# Patient Record
Sex: FEMALE | URBAN - METROPOLITAN AREA
[De-identification: names, ages, dates, MRNs, and addresses within clinical notes are randomized per-mention and may not be internally consistent; named-entity substitution may affect disease eponyms.]

---

## 2022-06-03 LAB
ANTIBODY SCREEN, EXTERNAL: NEGATIVE
CHLAMYDIA, EXTERNAL: NEGATIVE
HBSAG, EXTERNAL: NEGATIVE
HEPATITIS C AB,   EXT: NEGATIVE
HIV, EXTERNAL: NEGATIVE
N. GONORRHEA, EXTERNAL: NEGATIVE
RPR, EXTERNAL: NON REACTIVE
RUBELLA, EXTERNAL: NORMAL
TYPE, ABO & RH, EXTERNAL: NORMAL

## 2022-11-21 ENCOUNTER — HOSPITAL ENCOUNTER (EMERGENCY)
Age: 27
Discharge: HOME | End: 2022-11-21
Attending: OBSTETRICS & GYNECOLOGY

## 2022-11-21 VITALS
RESPIRATION RATE: 17 BRPM | DIASTOLIC BLOOD PRESSURE: 68 MMHG | SYSTOLIC BLOOD PRESSURE: 127 MMHG | HEART RATE: 88 BPM | TEMPERATURE: 98.3 F

## 2022-11-21 LAB
APPEARANCE UR: CLEAR
BACTERIA URNS QL MICRO: ABNORMAL /HPF
BILIRUB UR QL: NEGATIVE
COLOR UR: ABNORMAL
EPITH CASTS URNS QL MICRO: ABNORMAL /LPF
GLUCOSE UR STRIP.AUTO-MCNC: NEGATIVE MG/DL
HGB UR QL STRIP: NEGATIVE
HYALINE CASTS URNS QL MICRO: ABNORMAL /LPF (ref 0–5)
KETONES UR QL STRIP.AUTO: NEGATIVE MG/DL
LEUKOCYTE ESTERASE UR QL STRIP.AUTO: NEGATIVE
NITRITE UR QL STRIP.AUTO: NEGATIVE
PH UR STRIP: 7 [PH] (ref 5–8)
PROT UR STRIP-MCNC: NEGATIVE MG/DL
RBC #/AREA URNS HPF: ABNORMAL /HPF (ref 0–5)
SP GR UR REFRACTOMETRY: 1.01 (ref 1–1.03)
UA: UC IF INDICATED,UAUC: ABNORMAL
UROBILINOGEN UR QL STRIP.AUTO: 0.2 EU/DL (ref 0.2–1)
WBC URNS QL MICRO: ABNORMAL /HPF (ref 0–4)

## 2022-11-21 PROCEDURE — 99284 EMERGENCY DEPT VISIT MOD MDM: CPT

## 2022-11-21 PROCEDURE — 81001 URINALYSIS AUTO W/SCOPE: CPT

## 2022-11-21 RX ORDER — OMEPRAZOLE 10 MG/1
10 CAPSULE, DELAYED RELEASE ORAL DAILY
COMMUNITY

## 2022-11-21 NOTE — ED PROVIDER NOTES
Pt is a 29 yo G1 @ 34 2/7 wks who presents to [facility] with CC of decreased FM x 1.5 days and uterine cramps since yesterday. She denies any LOF, VB, changes in discharge. She reports that she normally easily gets her [facility] but now she's only getting about 5 movements per hour. She does endorse urinary frequency but no dysuria. She thinks she is hydrating well with 3-4 glasses/bottles of water per day, smaller bottles today. This pregnancy has been supervised per Dr. Jorge Alberto Garza of [facility] and is notable for IUGR (EFW 4#3oz 8%tile at 32 weeks) and failed 1 hour GTT but passed 3 hr GTT. Chief Complaint   Patient presents with    Contractions    Decreased Fetal Movement       Past Medical History:   Diagnosis Date    Asthma        History reviewed. No pertinent surgical history. History reviewed. No pertinent family history. Social History     Socioeconomic History    Marital status: Not on file     Spouse name: Not on file    Number of children: Not on file    Years of education: Not on file    Highest education level: Not on file   Occupational History    Not on file   Tobacco Use    Smoking status: Former     Types: Cigarettes     Quit date: 2012     Years since quitting: 10.8    Smokeless tobacco: Never   Substance and Sexual Activity    Alcohol use: Never    Drug use: Never    Sexual activity: Not on file   Other Topics Concern    Not on file   Social History Narrative    Not on file     Social Determinants of Health     Financial Resource Strain: Not on file   Food Insecurity: Not on file   Transportation Needs: Not on file   Physical Activity: Not on file   Stress: Not on file   Social Connections: Not on file   Intimate Partner Violence: Not on file   Housing Stability: Not on file         ALLERGIES: Milk    Review of Systems   Constitutional: Negative. HENT: Negative. Eyes: Negative. Respiratory: Negative. Cardiovascular: Negative. Gastrointestinal:  Positive for abdominal pain. Endocrine: Negative. Genitourinary:  Positive for frequency and pelvic pain. Musculoskeletal: Negative. Skin: Negative. Allergic/Immunologic: Negative. Neurological: Negative. Hematological: Negative. Psychiatric/Behavioral: Negative. Vitals:    11/21/22 1657   Resp: 17   Temp: 98.3 °F (36.8 °C)            Physical Exam  Vitals and nursing note reviewed. Exam conducted with a chaperone present. Constitutional:       Appearance: Normal appearance. HENT:      Head: Atraumatic. Eyes:      Extraocular Movements: Extraocular movements intact. Pulmonary:      Effort: Pulmonary effort is normal.   Abdominal:      Comments: Gravid, soft, non tender   Genitourinary:     General: Normal vulva. Comments: Cervix is closed but thin  Musculoskeletal:         General: Normal range of motion. Cervical back: Normal range of motion. Skin:     General: Skin is warm and dry. Neurological:      General: No focal deficit present. Mental Status: She is oriented to person, place, and time. Psychiatric:         Mood and Affect: Mood normal.         Behavior: Behavior normal.        MDM     Amount and/or Complexity of Data Reviewed  Clinical lab tests: ordered  Independent visualization of images, tracings, or specimens: (An NST was performed x 30 minutes and was reactive. The baseline FHR was 125. Moderate baseline  variability was noted. Accelerations of sufficient amplitude and duration were noted. There were no decelerations noted.   Uterine irritability noted.  )    Risk of Complications, Morbidity, and/or Mortality  Presenting problems: moderate  Diagnostic procedures: moderate  Management options: moderate    Patient Progress  Patient progress: stable            ED Course as of 11/21/22 1723   Mon Nov 21, 2022   1721 Admit to [facility]  Reactive NST  Uterine irritability on monitor:  cervix is soft but closed  Send UA & PO hydrate [DG]      ED Course User Index  [DG] [facility] DIONICIO       Procedures    @[facility]@

## 2022-11-21 NOTE — PROGRESS NOTES
1726 urine sample obtained and sent to lab. 1830 cervical exam is unchanged. Pt is cleared to be discharged home per Dr Nik Krause. All instructions given to pt and she states understanding.

## 2022-11-21 NOTE — PROGRESS NOTES
1650 pt arrives ambulatory accompanied by spouse w/ c/o mild cramping that began yesterday and has become pretty \"consistent over the last hour. \" Pt also reports feeling DFM over the past two days--about 5 movements an hour. Pt denies VB, LOF, h/a, blurry vision.  EFM applied and MD notified

## 2022-12-09 LAB — GRBS, EXTERNAL: NEGATIVE

## 2022-12-15 ENCOUNTER — HOSPITAL ENCOUNTER (INPATIENT)
Age: 27
LOS: 4 days | Discharge: HOME | DRG: 772 | End: 2022-12-19
Attending: OBSTETRICS & GYNECOLOGY | Admitting: OBSTETRICS & GYNECOLOGY
Payer: OTHER GOVERNMENT

## 2022-12-15 PROBLEM — O36.5990 IUGR (INTRAUTERINE GROWTH RESTRICTION) AFFECTING CARE OF MOTHER: Status: ACTIVE | Noted: 2022-12-15

## 2022-12-15 LAB
ERYTHROCYTE [DISTWIDTH] IN BLOOD BY AUTOMATED COUNT: 12.6 % (ref 11.5–14.5)
HCT VFR BLD AUTO: 36.3 % (ref 35–47)
HGB BLD-MCNC: 12.6 G/DL (ref 11.5–16)
MCH RBC QN AUTO: 33.2 PG (ref 26–34)
MCHC RBC AUTO-ENTMCNC: 34.7 G/DL (ref 30–36.5)
MCV RBC AUTO: 95.5 FL (ref 80–99)
NRBC # BLD: 0 K/UL (ref 0–0.01)
NRBC BLD-RTO: 0 PER 100 WBC
PLATELET # BLD AUTO: 162 K/UL (ref 150–400)
PMV BLD AUTO: 9.3 FL (ref 8.9–12.9)
RBC # BLD AUTO: 3.8 M/UL (ref 3.8–5.2)
WBC # BLD AUTO: 7.4 K/UL (ref 3.6–11)

## 2022-12-15 PROCEDURE — 75410000002 HC LABOR FEE PER 1 HR

## 2022-12-15 PROCEDURE — 3E0DXGC INTRODUCTION OF OTHER THERAPEUTIC SUBSTANCE INTO MOUTH AND PHARYNX, EXTERNAL APPROACH: ICD-10-PCS | Performed by: OBSTETRICS & GYNECOLOGY

## 2022-12-15 PROCEDURE — 86900 BLOOD TYPING SEROLOGIC ABO: CPT

## 2022-12-15 PROCEDURE — 65270000029 HC RM PRIVATE

## 2022-12-15 PROCEDURE — 74011250637 HC RX REV CODE- 250/637: Performed by: OBSTETRICS & GYNECOLOGY

## 2022-12-15 PROCEDURE — 85027 COMPLETE CBC AUTOMATED: CPT

## 2022-12-15 PROCEDURE — 36415 COLL VENOUS BLD VENIPUNCTURE: CPT

## 2022-12-15 RX ORDER — SODIUM CHLORIDE 0.9 % (FLUSH) 0.9 %
5-40 SYRINGE (ML) INJECTION EVERY 8 HOURS
Status: DISCONTINUED | OUTPATIENT
Start: 2022-12-15 | End: 2022-12-17 | Stop reason: HOSPADM

## 2022-12-15 RX ORDER — ONDANSETRON 2 MG/ML
8 INJECTION INTRAMUSCULAR; INTRAVENOUS
Status: DISCONTINUED | OUTPATIENT
Start: 2022-12-15 | End: 2022-12-17 | Stop reason: HOSPADM

## 2022-12-15 RX ORDER — ACETAMINOPHEN 500 MG
TABLET ORAL
COMMUNITY

## 2022-12-15 RX ORDER — OXYTOCIN/RINGER'S LACTATE 30/500 ML
10 PLASTIC BAG, INJECTION (ML) INTRAVENOUS AS NEEDED
Status: DISCONTINUED | OUTPATIENT
Start: 2022-12-15 | End: 2022-12-19 | Stop reason: HOSPADM

## 2022-12-15 RX ORDER — OXYTOCIN/RINGER'S LACTATE 30/500 ML
87.3 PLASTIC BAG, INJECTION (ML) INTRAVENOUS AS NEEDED
Status: DISCONTINUED | OUTPATIENT
Start: 2022-12-15 | End: 2022-12-19 | Stop reason: HOSPADM

## 2022-12-15 RX ORDER — NALBUPHINE HYDROCHLORIDE 10 MG/ML
10 INJECTION, SOLUTION INTRAMUSCULAR; INTRAVENOUS; SUBCUTANEOUS
Status: DISCONTINUED | OUTPATIENT
Start: 2022-12-15 | End: 2022-12-17 | Stop reason: HOSPADM

## 2022-12-15 RX ORDER — FENTANYL CITRATE 50 UG/ML
50 INJECTION, SOLUTION INTRAMUSCULAR; INTRAVENOUS
Status: DISCONTINUED | OUTPATIENT
Start: 2022-12-15 | End: 2022-12-17 | Stop reason: HOSPADM

## 2022-12-15 RX ORDER — SODIUM CHLORIDE, SODIUM LACTATE, POTASSIUM CHLORIDE, CALCIUM CHLORIDE 600; 310; 30; 20 MG/100ML; MG/100ML; MG/100ML; MG/100ML
125 INJECTION, SOLUTION INTRAVENOUS CONTINUOUS
Status: DISCONTINUED | OUTPATIENT
Start: 2022-12-15 | End: 2022-12-19 | Stop reason: HOSPADM

## 2022-12-15 RX ORDER — SODIUM CHLORIDE 0.9 % (FLUSH) 0.9 %
5-40 SYRINGE (ML) INJECTION AS NEEDED
Status: DISCONTINUED | OUTPATIENT
Start: 2022-12-15 | End: 2022-12-17 | Stop reason: HOSPADM

## 2022-12-15 RX ORDER — ZOLPIDEM TARTRATE 5 MG/1
5 TABLET ORAL ONCE
Status: ACTIVE | OUTPATIENT
Start: 2022-12-15 | End: 2022-12-16

## 2022-12-15 RX ORDER — TERBUTALINE SULFATE 1 MG/ML
0.25 INJECTION SUBCUTANEOUS AS NEEDED
Status: DISCONTINUED | OUTPATIENT
Start: 2022-12-15 | End: 2022-12-17 | Stop reason: HOSPADM

## 2022-12-15 RX ADMIN — Medication 50 MCG: at 16:58

## 2022-12-15 RX ADMIN — Medication 50 MCG: at 21:04

## 2022-12-15 NOTE — H&P
History & Physical    Name: Sadaf Avalos MRN: [id number]  SSN: [id number]    YOB: 1995  Age: 28 y.o. Sex: female      Subjective:     Estimated Date of Delivery: 22  OB History    Para Term  AB Living   1             SAB IAB Ectopic Molar Multiple Live Births                    # Outcome Date GA Lbr Chun/2nd Weight Sex Delivery Anes PTL Lv   1 Current                Ms. Mcadams Brochure is admitted with pregnancy at 37w5d for induction of labor due to IUGR. Prenatal course complicated by:  -IUGR diagnosed at 32 wk. EFW : 5#9oz(6%), AC 2%, has been following with [facility] with  testing and dopplers  -Elevated 1 hr, passed 3 hr GTT  -NIPT 46XY, AFP WNL  -Bpos/Rimm/GBS NEG  -She is s/p Tdap, flu, and covid vaccines including bivalent booster    -  Please see prenatal records for details. M recommended IOL between 40 and 38 wk for IUGR  Past Medical History:   Diagnosis Date    Asthma      No past surgical history on file. Social History     Occupational History    Not on file   Tobacco Use    Smoking status: Former     Types: Cigarettes     Quit date:      Years since quitting: 10.9    Smokeless tobacco: Never   Substance and Sexual Activity    Alcohol use: Never    Drug use: Never    Sexual activity: Not on file     No family history on file. Allergies   Allergen Reactions    Milk Unknown (comments)     Prior to Admission medications    Medication Sig Start Date End Date Taking? Authorizing Provider   PNV with Ca No.65-Iron Poly-FA 60 mg iron-1 mg cap Take  by mouth. Provider, Historical   omeprazole (PRILOSEC) 10 mg capsule Take 10 mg by mouth daily. Provider, Historical        Review of Systems: A comprehensive review of systems was negative except for that written in the History of Present Illness.     Objective:     Vitals:  Vitals:    12/15/22 1549   Weight: 90.7 kg (200 lb)   Height: 5' 7\" (1.702 m)        Physical Exam:  Patient without distress. Heart: Regular rate and rhythm  Lung: normal respiratory effort  Abdomen: soft, nontender  Cervical Exam: Closed/Thick/High  Lower Extremities:  - Edema No  Membranes:  Intact  Fetal Heart Rate: 130 moderate variability, present accels, no decels     Prenatal Labs:   No results found for: ABORH, RUBELLAEXT, HBSAGEXT, HIVEXT, RPREXT, GONNOEXT, CHLAMEXT, ABORHEXT, RUBELLAEXT, GRBSEXT, HBSAGEXT, HIVEXT, RPREXT, GONNOEXT, CHLAMEXT    Impression/Plan:     Active Problems:    IUGR (intrauterine growth restriction) affecting care of mother (12/15/2022)         Plan: Admit for induction of labor. Group B Strep negative  IOL with cytotec 50mcg q4hr prn fo cervical ripening. Reassess for cook balloon placement when able.   FHR cat 1    Betzaida Gonzalez MD   12/15/2022  4:34 PM

## 2022-12-15 NOTE — PROGRESS NOTES
Attempted insertion of cook balloon; ext os ftp/30/high and posterior. Unable to insert with speculum either. Will start misoprostol. Fht 120s mod +accels no decels.

## 2022-12-15 NOTE — ROUTINE PROCESS
12/15/2022  3:47 PM Patient arrived ambulatory to [facility] for scheduled IOL due to IUGR. Patient endorses positive fetal movement, denies leaking of fluid or vaginal bleeding. 1654: Call to Dr Steve Mccracken, plan to start cytotec now and evaluate for cervical ripening balloon. 1658: First dose of cytotec given per [facility]  1725: Dr Leana Lundy at bedside SVE fingertip/high and posterior. Unable to insert balloon at this time. Plan to continue cytotec and evaluate for insertion later. 1930: Bedside shift change report given to Chema Nguyen RN (oncoming nurse) by Juana Trevino RN (offgoing nurse). Report included the following information SBAR.

## 2022-12-16 PROCEDURE — 3E033VJ INTRODUCTION OF OTHER HORMONE INTO PERIPHERAL VEIN, PERCUTANEOUS APPROACH: ICD-10-PCS | Performed by: OBSTETRICS & GYNECOLOGY

## 2022-12-16 PROCEDURE — 74011250637 HC RX REV CODE- 250/637: Performed by: OBSTETRICS & GYNECOLOGY

## 2022-12-16 PROCEDURE — 74011250636 HC RX REV CODE- 250/636: Performed by: OBSTETRICS & GYNECOLOGY

## 2022-12-16 PROCEDURE — 75410000002 HC LABOR FEE PER 1 HR

## 2022-12-16 PROCEDURE — 65270000029 HC RM PRIVATE

## 2022-12-16 RX ORDER — OXYTOCIN/RINGER'S LACTATE 30/500 ML
0-20 PLASTIC BAG, INJECTION (ML) INTRAVENOUS
Status: DISCONTINUED | OUTPATIENT
Start: 2022-12-16 | End: 2022-12-17

## 2022-12-16 RX ADMIN — Medication 50 MCG: at 01:04

## 2022-12-16 RX ADMIN — Medication 50 MCG: at 09:01

## 2022-12-16 RX ADMIN — OXYTOCIN 1 MILLI-UNITS/MIN: 10 INJECTION INTRAVENOUS at 13:13

## 2022-12-16 RX ADMIN — ONDANSETRON HYDROCHLORIDE 8 MG: 2 SOLUTION INTRAMUSCULAR; INTRAVENOUS at 09:47

## 2022-12-16 RX ADMIN — SODIUM CHLORIDE, POTASSIUM CHLORIDE, SODIUM LACTATE AND CALCIUM CHLORIDE 500 ML: 600; 310; 30; 20 INJECTION, SOLUTION INTRAVENOUS at 09:45

## 2022-12-16 RX ADMIN — OXYTOCIN 8 MILLI-UNITS/MIN: 10 INJECTION INTRAVENOUS at 17:20

## 2022-12-16 RX ADMIN — SODIUM CHLORIDE, POTASSIUM CHLORIDE, SODIUM LACTATE AND CALCIUM CHLORIDE 125 ML/HR: 600; 310; 30; 20 INJECTION, SOLUTION INTRAVENOUS at 15:09

## 2022-12-16 RX ADMIN — SODIUM CHLORIDE, POTASSIUM CHLORIDE, SODIUM LACTATE AND CALCIUM CHLORIDE 125 ML/HR: 600; 310; 30; 20 INJECTION, SOLUTION INTRAVENOUS at 13:13

## 2022-12-16 RX ADMIN — SODIUM CHLORIDE, POTASSIUM CHLORIDE, SODIUM LACTATE AND CALCIUM CHLORIDE 125 ML/HR: 600; 310; 30; 20 INJECTION, SOLUTION INTRAVENOUS at 22:48

## 2022-12-16 RX ADMIN — Medication 50 MCG: at 05:10

## 2022-12-16 NOTE — PROGRESS NOTES
Labor Progress Note  Patient seen, fetal heart rate and contraction pattern evaluated, patient examined.     Visit Vitals  BP (!) 110/58   Pulse 80   Temp 98.2 °F (36.8 °C)   Resp 16   Ht 5' 7\" (1.702 m)   Wt 90.7 kg (200 lb)   SpO2 99%   BMI 31.32 kg/m²         Physical Exam:  Cervical Exam: 3-4/50/ballotable    FHR: 140 moderate variability, present accels, no decels    Assessment/Plan:  G1 @ 37w6d with IOL with IUGR  Cook catheter removed  Currently on pit, will continue pit and then AROM when able  GBS neg  FHR cat 1  Kesha Fair MD   12/16/2022  4:37 PM

## 2022-12-16 NOTE — PROGRESS NOTES
Labor Progress Note  G1 @ 37w6d with IOL with IUGR  S/P Jos  Currently on Pitocin  Pt sitting up with her mom and spouse at the bedside   States feeling mild contractions  Patient seen, fetal heart rate and contraction pattern evaluated. VSS AF  Physical Exam:  Cervical Exam: not examined  Membranes:  Intact  Uterine Activity: Frequency: Irregular  Fetal Heart Rate: Reactive  Accelerations: yes  Decelerations: none  Uterine contractions: irregular    Assessment/Plan:  Reassuring fetal status.    Will continue to titrate Dallin Kennedy MD

## 2022-12-16 NOTE — PROGRESS NOTES
Labor Progress Note  Patient seen, fetal heart rate and contraction pattern evaluated, patient examined.   Was able to sleep a bit last night  Visit Vitals  /75 (BP 1 Location: Left arm, BP Patient Position: At rest)   Pulse 70   Temp 98 °F (36.7 °C)   Resp 16   Ht 5' 7\" (1.702 m)   Wt 90.7 kg (200 lb)   SpO2 99%   BMI 31.32 kg/m²         Physical Exam:  Cervical Exam:  1/30/-3    FHR: 120 moderate variability, present accels, no decels    Assessment/Plan:  G1 @ 37w6d with IOL with IUGR  Cook catheter placed 60/60 at this check  Will do one more dose of cytotec (5th dose) and then transition to pitocin  GBS neg  FHR cat 1  Augustine Arana MD   12/16/2022  7:58 AM

## 2022-12-16 NOTE — PROGRESS NOTES
1940 Bedside and Verbal shift change report given to WYATT Arnett, RN by LD Patten, SHANKAR. Report included the following information SBAR, Intake/Output, and MAR.      2055 Patient off monitor to bathroom. 2103 Patient placed on monitor. 0530 Bedside and Verbal shift change report given to BRINDA Vanegas, SHANKAR by Melissa Castellanos. Patricia Arnett RN. Report included the following information SBAR, Intake/Output, and MAR.

## 2022-12-16 NOTE — PROGRESS NOTES
1545 Bedside and Verbal shift change report received from PITER Marin, SHANKAR. Report included the following information SBAR, Intake/Output, MAR, and Recent Results. 36 Dr. Roberta Suárez at bedside, cervical ripening balloon deflated 60/60 and removed intact. SVE 3-4/50/-3  1910 FHR tracing reviewed by Dr. Cathy Culver, Oxytocin decreased to 6 mu/min  1930 Bedside and Verbal shift change report given to LD Patten RN by Jairo Zendejas. Report included the following information SBAR, Intake/Output, MAR, and Recent Results.

## 2022-12-16 NOTE — PROGRESS NOTES
Labor Progress Note  Patient seen, fetal heart rate and contraction pattern evaluated, patient examined.   Feeling more contractions  Visit Vitals  BP (!) 108/58   Pulse 96   Temp 98 °F (36.7 °C)   Resp 16   Ht 5' 7\" (1.702 m)   Wt 90.7 kg (200 lb)   SpO2 99%   BMI 31.32 kg/m²         Physical Exam:  Cervical Exam: cook in place, not rechecked    FHR: 130 moderate variability, present accels, no decels    Assessment/Plan:  G1 @ 37w6d with IOL with IUGR  Cook catheter placed 60/60 at 0745  S/p 5 doses of cytotec, plan to transition to pit in next hour  GBS neg  FHR cat 1  Jay Vargas MD   12/16/2022  12:35 PM

## 2022-12-16 NOTE — PROGRESS NOTES
3535 SBAR report received from [employer] RN    3865 Cervical ripening balloon placed by Dr Amy Tinajero    8709 Nausea continues- IV bolus begun and Zofran given    9589 Bedside SBAR report to Ravin Garcia RN

## 2022-12-17 ENCOUNTER — ANESTHESIA (OUTPATIENT)
Dept: LABOR AND DELIVERY | Age: 27
DRG: 772 | End: 2022-12-17
Payer: OTHER GOVERNMENT

## 2022-12-17 ENCOUNTER — ANESTHESIA EVENT (OUTPATIENT)
Dept: LABOR AND DELIVERY | Age: 27
DRG: 772 | End: 2022-12-17
Payer: OTHER GOVERNMENT

## 2022-12-17 PROCEDURE — 76010000392 HC C SECN EA ADDL 0.5 HR: Performed by: OBSTETRICS & GYNECOLOGY

## 2022-12-17 PROCEDURE — 74011250636 HC RX REV CODE- 250/636: Performed by: ANESTHESIOLOGY

## 2022-12-17 PROCEDURE — 74011000250 HC RX REV CODE- 250: Performed by: ANESTHESIOLOGY

## 2022-12-17 PROCEDURE — 74011250637 HC RX REV CODE- 250/637: Performed by: OBSTETRICS & GYNECOLOGY

## 2022-12-17 PROCEDURE — 76010000391 HC C SECN FIRST 1 HR: Performed by: OBSTETRICS & GYNECOLOGY

## 2022-12-17 PROCEDURE — 74011250636 HC RX REV CODE- 250/636: Performed by: OBSTETRICS & GYNECOLOGY

## 2022-12-17 PROCEDURE — 74011000250 HC RX REV CODE- 250: Performed by: OBSTETRICS & GYNECOLOGY

## 2022-12-17 PROCEDURE — 65270000029 HC RM PRIVATE

## 2022-12-17 PROCEDURE — 74011000250 HC RX REV CODE- 250: Performed by: NURSE ANESTHETIST, CERTIFIED REGISTERED

## 2022-12-17 PROCEDURE — 75410000002 HC LABOR FEE PER 1 HR

## 2022-12-17 PROCEDURE — 74011250636 HC RX REV CODE- 250/636: Performed by: ADVANCED PRACTICE MIDWIFE

## 2022-12-17 PROCEDURE — 75410000003 HC RECOV DEL/VAG/CSECN EA 0.5 HR: Performed by: OBSTETRICS & GYNECOLOGY

## 2022-12-17 PROCEDURE — 74011000258 HC RX REV CODE- 258: Performed by: ADVANCED PRACTICE MIDWIFE

## 2022-12-17 PROCEDURE — 10907ZC DRAINAGE OF AMNIOTIC FLUID, THERAPEUTIC FROM PRODUCTS OF CONCEPTION, VIA NATURAL OR ARTIFICIAL OPENING: ICD-10-PCS | Performed by: OBSTETRICS & GYNECOLOGY

## 2022-12-17 PROCEDURE — 74011250636 HC RX REV CODE- 250/636: Performed by: NURSE ANESTHETIST, CERTIFIED REGISTERED

## 2022-12-17 PROCEDURE — 76060000078 HC EPIDURAL ANESTHESIA: Performed by: OBSTETRICS & GYNECOLOGY

## 2022-12-17 PROCEDURE — 74011250637 HC RX REV CODE- 250/637: Performed by: ADVANCED PRACTICE MIDWIFE

## 2022-12-17 RX ORDER — NALOXONE HYDROCHLORIDE 0.4 MG/ML
0.4 INJECTION, SOLUTION INTRAMUSCULAR; INTRAVENOUS; SUBCUTANEOUS AS NEEDED
Status: DISCONTINUED | OUTPATIENT
Start: 2022-12-17 | End: 2022-12-17 | Stop reason: HOSPADM

## 2022-12-17 RX ORDER — CALCIUM CARBONATE 200(500)MG
1000 TABLET,CHEWABLE ORAL ONCE
Status: COMPLETED | OUTPATIENT
Start: 2022-12-17 | End: 2022-12-17

## 2022-12-17 RX ORDER — OXYTOCIN/RINGER'S LACTATE 30/500 ML
PLASTIC BAG, INJECTION (ML) INTRAVENOUS
Status: COMPLETED
Start: 2022-12-17 | End: 2022-12-17

## 2022-12-17 RX ORDER — FENTANYL/BUPIVACAINE/NS/PF 2-1250MCG
1-16 PREFILLED PUMP RESERVOIR EPIDURAL CONTINUOUS
Status: DISCONTINUED | OUTPATIENT
Start: 2022-12-17 | End: 2022-12-17 | Stop reason: HOSPADM

## 2022-12-17 RX ORDER — PHENYLEPHRINE HCL IN 0.9% NACL 0.4MG/10ML
SYRINGE (ML) INTRAVENOUS AS NEEDED
Status: DISCONTINUED | OUTPATIENT
Start: 2022-12-17 | End: 2022-12-17 | Stop reason: HOSPADM

## 2022-12-17 RX ORDER — FENTANYL CITRATE 50 UG/ML
100 INJECTION, SOLUTION INTRAMUSCULAR; INTRAVENOUS ONCE
Status: DISCONTINUED | OUTPATIENT
Start: 2022-12-17 | End: 2022-12-17 | Stop reason: HOSPADM

## 2022-12-17 RX ORDER — LIDOCAINE HYDROCHLORIDE AND EPINEPHRINE 15; 5 MG/ML; UG/ML
INJECTION, SOLUTION EPIDURAL AS NEEDED
Status: DISCONTINUED | OUTPATIENT
Start: 2022-12-17 | End: 2022-12-17 | Stop reason: HOSPADM

## 2022-12-17 RX ORDER — ESMOLOL HYDROCHLORIDE 10 MG/ML
INJECTION INTRAVENOUS AS NEEDED
Status: DISCONTINUED | OUTPATIENT
Start: 2022-12-17 | End: 2022-12-17 | Stop reason: HOSPADM

## 2022-12-17 RX ORDER — ACETAMINOPHEN 500 MG
1000 TABLET ORAL
Status: COMPLETED | OUTPATIENT
Start: 2022-12-17 | End: 2022-12-17

## 2022-12-17 RX ORDER — OXYTOCIN/RINGER'S LACTATE 30/500 ML
0-20 PLASTIC BAG, INJECTION (ML) INTRAVENOUS
Status: DISCONTINUED | OUTPATIENT
Start: 2022-12-17 | End: 2022-12-17

## 2022-12-17 RX ORDER — BUPIVACAINE HYDROCHLORIDE 5 MG/ML
30 INJECTION, SOLUTION EPIDURAL; INTRACAUDAL AS NEEDED
Status: DISCONTINUED | OUTPATIENT
Start: 2022-12-17 | End: 2022-12-17 | Stop reason: HOSPADM

## 2022-12-17 RX ORDER — NORETHINDRONE AND ETHINYL ESTRADIOL 0.5-0.035
10 KIT ORAL ONCE
Status: COMPLETED | OUTPATIENT
Start: 2022-12-17 | End: 2022-12-17

## 2022-12-17 RX ORDER — FENTANYL CITRATE 50 UG/ML
INJECTION, SOLUTION INTRAMUSCULAR; INTRAVENOUS AS NEEDED
Status: DISCONTINUED | OUTPATIENT
Start: 2022-12-17 | End: 2022-12-17 | Stop reason: HOSPADM

## 2022-12-17 RX ORDER — BUPIVACAINE HYDROCHLORIDE 5 MG/ML
INJECTION, SOLUTION EPIDURAL; INTRACAUDAL AS NEEDED
Status: DISCONTINUED | OUTPATIENT
Start: 2022-12-17 | End: 2022-12-17 | Stop reason: HOSPADM

## 2022-12-17 RX ORDER — CARBOPROST TROMETHAMINE 250 UG/ML
INJECTION, SOLUTION INTRAMUSCULAR
Status: DISCONTINUED
Start: 2022-12-17 | End: 2022-12-17 | Stop reason: WASHOUT

## 2022-12-17 RX ORDER — METHYLERGONOVINE MALEATE 0.2 MG/ML
INJECTION INTRAVENOUS
Status: DISCONTINUED
Start: 2022-12-17 | End: 2022-12-17 | Stop reason: WASHOUT

## 2022-12-17 RX ORDER — TRANEXAMIC ACID 100 MG/ML
INJECTION, SOLUTION INTRAVENOUS
Status: DISCONTINUED
Start: 2022-12-17 | End: 2022-12-17 | Stop reason: WASHOUT

## 2022-12-17 RX ORDER — OXYTOCIN/RINGER'S LACTATE 30/500 ML
0-20 PLASTIC BAG, INJECTION (ML) INTRAVENOUS
Status: DISCONTINUED | OUTPATIENT
Start: 2022-12-17 | End: 2022-12-17 | Stop reason: HOSPADM

## 2022-12-17 RX ORDER — OXYTOCIN 10 [USP'U]/ML
INJECTION, SOLUTION INTRAMUSCULAR; INTRAVENOUS
Status: DISCONTINUED
Start: 2022-12-17 | End: 2022-12-17 | Stop reason: WASHOUT

## 2022-12-17 RX ORDER — LIDOCAINE HYDROCHLORIDE AND EPINEPHRINE 20; 5 MG/ML; UG/ML
INJECTION, SOLUTION EPIDURAL; INFILTRATION; INTRACAUDAL; PERINEURAL AS NEEDED
Status: DISCONTINUED | OUTPATIENT
Start: 2022-12-17 | End: 2022-12-17 | Stop reason: HOSPADM

## 2022-12-17 RX ORDER — MORPHINE SULFATE 0.5 MG/ML
INJECTION, SOLUTION EPIDURAL; INTRATHECAL; INTRAVENOUS AS NEEDED
Status: DISCONTINUED | OUTPATIENT
Start: 2022-12-17 | End: 2022-12-17 | Stop reason: HOSPADM

## 2022-12-17 RX ORDER — MISOPROSTOL 200 UG/1
TABLET ORAL
Status: DISCONTINUED
Start: 2022-12-17 | End: 2022-12-17 | Stop reason: WASHOUT

## 2022-12-17 RX ADMIN — SODIUM CHLORIDE, POTASSIUM CHLORIDE, SODIUM LACTATE AND CALCIUM CHLORIDE 125 ML/HR: 600; 310; 30; 20 INJECTION, SOLUTION INTRAVENOUS at 11:35

## 2022-12-17 RX ADMIN — LIDOCAINE HYDROCHLORIDE,EPINEPHRINE BITARTRATE 5 MG: 20; .005 INJECTION, SOLUTION EPIDURAL; INFILTRATION; INTRACAUDAL; PERINEURAL at 20:44

## 2022-12-17 RX ADMIN — CALCIUM CARBONATE (ANTACID) CHEW TAB 500 MG 1000 MG: 500 CHEW TAB at 05:28

## 2022-12-17 RX ADMIN — SODIUM CHLORIDE, POTASSIUM CHLORIDE, SODIUM LACTATE AND CALCIUM CHLORIDE 125 ML/HR: 600; 310; 30; 20 INJECTION, SOLUTION INTRAVENOUS at 20:29

## 2022-12-17 RX ADMIN — EPHEDRINE SULFATE 10 MG: 50 INJECTION INTRAVENOUS at 18:00

## 2022-12-17 RX ADMIN — AZITHROMYCIN MONOHYDRATE 500 MG: 500 INJECTION, POWDER, LYOPHILIZED, FOR SOLUTION INTRAVENOUS at 20:59

## 2022-12-17 RX ADMIN — AMPICILLIN SODIUM 2 G: 2 INJECTION, POWDER, FOR SOLUTION INTRAMUSCULAR; INTRAVENOUS at 15:56

## 2022-12-17 RX ADMIN — LIDOCAINE HYDROCHLORIDE,EPINEPHRINE BITARTRATE 5 MG: 20; .005 INJECTION, SOLUTION EPIDURAL; INFILTRATION; INTRACAUDAL; PERINEURAL at 20:41

## 2022-12-17 RX ADMIN — FENTANYL CITRATE 100 MCG: 50 INJECTION, SOLUTION INTRAMUSCULAR; INTRAVENOUS at 11:45

## 2022-12-17 RX ADMIN — LIDOCAINE HYDROCHLORIDE,EPINEPHRINE BITARTRATE 5 ML: 15; .005 INJECTION, SOLUTION EPIDURAL; INFILTRATION; INTRACAUDAL; PERINEURAL at 11:45

## 2022-12-17 RX ADMIN — BUPIVACAINE HYDROCHLORIDE 2 ML: 5 INJECTION, SOLUTION EPIDURAL; INTRACAUDAL; PERINEURAL at 11:45

## 2022-12-17 RX ADMIN — FENTANYL CITRATE 100 MCG: 50 INJECTION, SOLUTION INTRAMUSCULAR; INTRAVENOUS at 20:45

## 2022-12-17 RX ADMIN — Medication 10 ML/HR: at 11:50

## 2022-12-17 RX ADMIN — Medication 3 MG: at 21:43

## 2022-12-17 RX ADMIN — KETOROLAC TROMETHAMINE 30 MG: 30 INJECTION, SOLUTION INTRAMUSCULAR; INTRAVENOUS at 22:00

## 2022-12-17 RX ADMIN — SODIUM CHLORIDE, POTASSIUM CHLORIDE, SODIUM LACTATE AND CALCIUM CHLORIDE 1000 ML: 600; 310; 30; 20 INJECTION, SOLUTION INTRAVENOUS at 20:29

## 2022-12-17 RX ADMIN — Medication 10 ML/HR: at 19:47

## 2022-12-17 RX ADMIN — Medication 80 MCG: at 20:57

## 2022-12-17 RX ADMIN — ESMOLOL HYDROCHLORIDE 20 MG: 10 INJECTION, SOLUTION INTRAVENOUS at 20:57

## 2022-12-17 RX ADMIN — SODIUM CHLORIDE, POTASSIUM CHLORIDE, SODIUM LACTATE AND CALCIUM CHLORIDE 125 ML/HR: 600; 310; 30; 20 INJECTION, SOLUTION INTRAVENOUS at 15:51

## 2022-12-17 RX ADMIN — Medication 1 MILLI-UNITS/MIN: at 16:00

## 2022-12-17 RX ADMIN — CEFAZOLIN 2 G: 1 INJECTION, POWDER, FOR SOLUTION INTRAMUSCULAR; INTRAVENOUS at 20:44

## 2022-12-17 RX ADMIN — OXYTOCIN 1 MILLI-UNITS/MIN: 10 INJECTION INTRAVENOUS at 08:15

## 2022-12-17 RX ADMIN — ACETAMINOPHEN 1000 MG: 500 TABLET ORAL at 15:49

## 2022-12-17 RX ADMIN — SODIUM CHLORIDE, PRESERVATIVE FREE 5 ML: 5 INJECTION INTRAVENOUS at 18:00

## 2022-12-17 RX ADMIN — LIDOCAINE HYDROCHLORIDE,EPINEPHRINE BITARTRATE 5 MG: 20; .005 INJECTION, SOLUTION EPIDURAL; INFILTRATION; INTRACAUDAL; PERINEURAL at 20:38

## 2022-12-17 NOTE — PROGRESS NOTES
Labor Progress Note  Assumed care of pt under [facility] service   Patient seen, fetal heart rate and contraction pattern evaluated.  Reviewed POC with pt and family to increase pit to effect   Visit Vitals  /67   Pulse 92   Temp 98.4 °F (36.9 °C)   Resp 18   Ht 5' 7\" (1.702 m)   Wt 200 lb (90.7 kg)   SpO2 99%   BMI 31.32 kg/m²       Physical Exam:  Cervical Exam:  deferred   Membranes:   AROM by Bharath   Uterine Activity: mild infrequent  Fetal Heart Rate: Reactive  Pit 2 mu/mins    Assessment/Plan:  Reassuring fetal status, Continue plan for vaginal delivery    Antonio Fallon CNM

## 2022-12-17 NOTE — PROGRESS NOTES
Labor Progress Note  Patient seen, fetal heart rate and contraction pattern evaluated, patient examined. Visit Vitals  BP (!) 116/54 (BP 1 Location: Right arm, BP Patient Position: Lying left side)   Pulse 80   Temp 99.3 °F (37.4 °C)   Resp 16   Ht 5' 7\" (1.702 m)   Wt 200 lb (90.7 kg)   SpO2 97%   BMI 31.32 kg/m²       Physical Exam:  Cervical Exam:  4/edematous/-2 molding   Membranes:   Clear fluid  Uterine Activity: 3-4 not adequate  Fetal Heart Rate: Cat 2    Assessment/Plan:  Reassuring fetal status    Plan to increase pit to effect - when adequate for 2 hours recheck if no change proceed with a section.      Jeison Taylor CNM

## 2022-12-17 NOTE — PROGRESS NOTES
Labor Progress Note  Patient seen, fetal heart rate and contraction pattern evaluated. States feeling no contractions  Had gotten a pit break. Took shower and is ready to start on the second round of pitocin    VSS AF    Physical Exam:  Cervical Exam:4/70/-3: AROM : clear fluid  Membranes:  AROM  Uterine Activity: Frequency: Irregular  Fetal Heart Rate: Reactive  Accelerations: yes  Decelerations: none  Uterine contractions: irregular    Assessment/Plan:  Reassuring fetal status.    Continue to titrate Shaan Cruz MD

## 2022-12-17 NOTE — ANESTHESIA PROCEDURE NOTES
Epidural Block    Patient location during procedure: OB  Start time: 12/17/2022 11:39 AM  End time: 12/17/2022 11:49 AM  Reason for block: labor epidural  Staffing  Performed: attending   Anesthesiologist: Alcides Mcleod MD  Preanesthetic Checklist  Completed: patient identified, IV checked, site marked, risks and benefits discussed, surgical consent, monitors and equipment checked, pre-op evaluation and timeout performed  Block Placement  Patient position: left lateral decubitus  Prep: DuraPrep  Sterility prep: cap, drape, gloves and mask  Sedation level: no sedation  Patient monitoring: continuous pulse oximetry and heart rate  Approach: midline  Location: lumbar  Lumbar location: L3-L4  Epidural  Loss of resistance technique: air  Guidance: landmark technique  Needle  Needle type: Tuohy   Needle gauge: 17 G  Needle length: 9 cm  Needle insertion depth: 7 cm  Catheter type: end hole  Catheter size: 19 G  Catheter at skin depth: 12 cm  Catheter securement method: clear occlusive dressing, liquid medical adhesive and surgical tape  Assessment  Sensory level: T6  Block outcome: pain improved  Number of attempts: 1  Procedure assessment: patient tolerated procedure well with no immediate complications

## 2022-12-17 NOTE — PROGRESS NOTES
OBHospitalist Note:  Consulted By CNM Managing Patient for Variable Decelerations  Chart Reviewed:  Day 3 IOL for IUGR per MFM, S/P Cytotec, C/C and Pitocin, and AROM this Am by Dr Brendon Kaiser @ 4 cm (quiroga bulb).   Now With variable decelerations of=n EFM, with moderate variability  Recommend- IUPC with Amniofusion for Varible Decelerations  Continued Close Observation- continued CNM Management @ this Time

## 2022-12-17 NOTE — ANESTHESIA PREPROCEDURE EVALUATION
Relevant Problems   No relevant active problems       Anesthetic History   No history of anesthetic complications            Review of Systems / Medical History  Patient summary reviewed, nursing notes reviewed and pertinent labs reviewed    Pulmonary  Within defined limits          Asthma        Neuro/Psych   Within defined limits           Cardiovascular  Within defined limits                     GI/Hepatic/Renal  Within defined limits              Endo/Other  Within defined limits      Obesity     Other Findings              Physical Exam    Airway  Mallampati: II  TM Distance: > 6 cm  Neck ROM: normal range of motion   Mouth opening: Normal     Cardiovascular  Regular rate and rhythm,  S1 and S2 normal,  no murmur, click, rub, or gallop             Dental  No notable dental hx       Pulmonary  Breath sounds clear to auscultation               Abdominal  GI exam deferred       Other Findings            Anesthetic Plan    ASA: 2  Anesthesia type: epidural          Induction: Intravenous  Anesthetic plan and risks discussed with: Patient

## 2022-12-17 NOTE — PROGRESS NOTES
Labor Progress Note  FHR review, variables with each contraction - reviewed with Dr. Robinson Pinzon  Patient seen, fetal heart rate and contraction pattern evaluated, patient examined.         Visit Vitals  /77 (BP 1 Location: Left upper arm, BP Patient Position: At rest)   Pulse 79   Resp 20   Ht 5' 4\" (1.626 m)   SpO2 99%       Physical Exam:  Cervical Exam:  4/80/-2  Membranes:  slightly blood tinged fluid  Uterine Activity: 2-3  Fetal Heart Rate: Cat 2    Assessment/Plan:  Discussed strip with Dr. Robinson Pinzon-    IUPC placed amnio infusion 300 ml bolus      Alee Thakkar CNM

## 2022-12-17 NOTE — PROGRESS NOTES
0730 Bedside and Verbal shift change report received from MELYSSA Gibson RN. Report included the following information SBAR, Intake/Output, MAR, and Recent Results. 0351 Oxytocin started at 1 mu/min  0830 DEEPA Roland at [facility] 110 FHR tracing, discussing plan of care with patient. Ema Wilhelm CNM notified of repediative variable decels, requested to view FHR tracing. Oxytocin remains at 6 mu, She will discuss management with Dr. Jaylene Pope and advise   Ema Wilhelm CNM at bedside, viewing FHR tracing, discussing plan of care with patient, Will proceed with IUPC insertion and amnioinfusion, orders received, SVE 4/80/-2, IUPC inserted after zeroed  1055 Amnioinfusion with lactated ringers, 300 ml bolus started  1107 Patient very uncomfortable with contractions, some relaxation palpated between contractions but patient does not feeling that the contractions are relaxing, Oxytocin off  1115 Amnioinfusion bolus completed and decreased to 125/ml/hour. Chux changed with 50 ml fluid return  1122 Patient increasing more uncomfortable and crying with contractions. she does not feel any break between contractions, amnioinfusion stopped, Conner Toledo CNM notified. Patient desires epidural, IVF bolus started, Dr. Zakiya Schmitz   Notified  9850 1142 Repositioned to left side and prepared for epidural placement  1136 Dr. Zakiya Schmitz at bedside, epidural intraprocedural time out done  1138 Epidural started  1143 Epidural placed  1200 Comfortable with contractions  1205 Oxytocin restarted at 1 mu/min  1207 Repositioned to right side chux changed, 5 ml fluid return  1210 Skyla Marc CNM at bedside, viewing FHR tracing, IUPC rezeroed, abdomen palpates relaxed  1333 FHR prolonged decels noted, Oxytocin off, IVF bolus started, chux changed with 5 ml fluid noted  1345 DEEPA Roland notified and viewed FHR tracing, will continue to monitor  1530 Straight cath for 325 ml clear yellow urine  1533 Temp. 100.4, IVF bolus started, Skyla Marc CNM notified orders received  1549 Tylenol 1000 mg PO given  1549 Ampicillin 2 grams IVPB started  Hannah at bedside, viewing FHR tracing  1600 Oxytocin restarted at 1 mu/min  1800 BP 87/52, Ephedrine 10 mg IV given  1815 GAYE Chanel CNM at bedside, viewing FHR tracing discussing plan of care with patient and family, SVE 4.5/edematous/-2, order received to increase oxytocin by 2 mu, oxytocin increased to 5mu  1850 Quiroga catheter placed, pericare/quiroga care done  1940 FHR late and prolonged decels, oxytocin off, Jeyson Willingham CNM notified, Bedside and Verbal shift change report given to PITER Lopez RN  by [facility]. Report included the following information SBAR, Intake/Output, MAR, and Recent Results.

## 2022-12-17 NOTE — PROGRESS NOTES
Labor Progress Note  Patient seen, sleeping well,  and pt mother at bedside, fetal heart rate and contraction pattern evaluated. Tempt elevated, bolus running   Visit Vitals  BP (!) 109/57   Pulse 85   Temp 100.4 °F (38 °C)   Resp 16   Ht 5' 7\" (1.702 m)   Wt 200 lb (90.7 kg)   SpO2 96%   BMI 31.32 kg/m²       Physical Exam:  Cervical Exam:  deferred  Uterine Activity: 5  Fetal Heart Rate: Cat 2    Assessment/Plan:  Reassuring fetal status, Continue plan for vaginal delivery    Reviewed plan for antibiotics and tylenol at this time with bolus   Pt  in agreement, offered to wake pt to review however pt states he is comfortable with this plan and in agreement. Mother is as well. Restart pit on one and increasing slowly.      Hi Coleman CNM

## 2022-12-17 NOTE — PROGRESS NOTES
2330 - Bedside and Verbal shift change report given to MELYSSA Lundberg RN (oncoming nurse) by LD Patten RN (offgoing nurse). Report included the following information SBAR, MAR, and Recent Results. 46 - Dr. Vlad Shabazz notified of [address], orders received for 2 hour pitocin break with 1000mg tums. 1702 - patient off monitors to shower, ok per Dr. Remberto Hernandez - Dr. Vlad Shabazz at bedside to assess, SVE 4/70/-3, AROM, small amount of clear fluid. 0730 - Bedside and Verbal shift change report given to D. Seaver,RN (oncoming nurse) by MELYSSA Lundberg RN (offgoing nurse). Report included the following information SBAR, MAR, and Recent Results.

## 2022-12-18 LAB
ABO + RH BLD: NORMAL
BASOPHILS # BLD: 0 K/UL (ref 0–0.1)
BASOPHILS NFR BLD: 0 % (ref 0–1)
BLOOD GROUP ANTIBODIES SERPL: NORMAL
DIFFERENTIAL METHOD BLD: ABNORMAL
EOSINOPHIL # BLD: 0 K/UL (ref 0–0.4)
EOSINOPHIL NFR BLD: 0 % (ref 0–7)
ERYTHROCYTE [DISTWIDTH] IN BLOOD BY AUTOMATED COUNT: 12.7 % (ref 11.5–14.5)
HCT VFR BLD AUTO: 30.4 % (ref 35–47)
HGB BLD-MCNC: 10.4 G/DL (ref 11.5–16)
IMM GRANULOCYTES # BLD AUTO: 0.1 K/UL (ref 0–0.04)
IMM GRANULOCYTES NFR BLD AUTO: 0 % (ref 0–0.5)
LYMPHOCYTES # BLD: 1.2 K/UL (ref 0.8–3.5)
LYMPHOCYTES NFR BLD: 9 % (ref 12–49)
MCH RBC QN AUTO: 32.5 PG (ref 26–34)
MCHC RBC AUTO-ENTMCNC: 34.2 G/DL (ref 30–36.5)
MCV RBC AUTO: 95 FL (ref 80–99)
MONOCYTES # BLD: 0.7 K/UL (ref 0–1)
MONOCYTES NFR BLD: 6 % (ref 5–13)
NEUTS SEG # BLD: 10.4 K/UL (ref 1.8–8)
NEUTS SEG NFR BLD: 85 % (ref 32–75)
NRBC # BLD: 0 K/UL (ref 0–0.01)
NRBC BLD-RTO: 0 PER 100 WBC
PLATELET # BLD AUTO: 123 K/UL (ref 150–400)
PMV BLD AUTO: 9.3 FL (ref 8.9–12.9)
RBC # BLD AUTO: 3.2 M/UL (ref 3.8–5.2)
SPECIMEN EXP DATE BLD: NORMAL
WBC # BLD AUTO: 12.3 K/UL (ref 3.6–11)

## 2022-12-18 PROCEDURE — 65410000002 HC RM PRIVATE OB

## 2022-12-18 PROCEDURE — 75410000002 HC LABOR FEE PER 1 HR

## 2022-12-18 PROCEDURE — 74011250637 HC RX REV CODE- 250/637: Performed by: OBSTETRICS & GYNECOLOGY

## 2022-12-18 PROCEDURE — 75410000003 HC RECOV DEL/VAG/CSECN EA 0.5 HR

## 2022-12-18 PROCEDURE — 85025 COMPLETE CBC W/AUTO DIFF WBC: CPT

## 2022-12-18 PROCEDURE — 76060000078 HC EPIDURAL ANESTHESIA

## 2022-12-18 PROCEDURE — 74011250636 HC RX REV CODE- 250/636: Performed by: OBSTETRICS & GYNECOLOGY

## 2022-12-18 PROCEDURE — 36415 COLL VENOUS BLD VENIPUNCTURE: CPT

## 2022-12-18 PROCEDURE — 74011250636 HC RX REV CODE- 250/636: Performed by: ANESTHESIOLOGY

## 2022-12-18 RX ORDER — OXYTOCIN/RINGER'S LACTATE 30/500 ML
87.3 PLASTIC BAG, INJECTION (ML) INTRAVENOUS AS NEEDED
Status: DISCONTINUED | OUTPATIENT
Start: 2022-12-18 | End: 2022-12-19 | Stop reason: HOSPADM

## 2022-12-18 RX ORDER — ONDANSETRON 2 MG/ML
4 INJECTION INTRAMUSCULAR; INTRAVENOUS
Status: DISCONTINUED | OUTPATIENT
Start: 2022-12-18 | End: 2022-12-19 | Stop reason: HOSPADM

## 2022-12-18 RX ORDER — OXYTOCIN/RINGER'S LACTATE 30/500 ML
10 PLASTIC BAG, INJECTION (ML) INTRAVENOUS AS NEEDED
Status: DISCONTINUED | OUTPATIENT
Start: 2022-12-18 | End: 2022-12-19 | Stop reason: HOSPADM

## 2022-12-18 RX ORDER — ACETAMINOPHEN 325 MG/1
650 TABLET ORAL
Status: DISCONTINUED | OUTPATIENT
Start: 2022-12-18 | End: 2022-12-18

## 2022-12-18 RX ORDER — DIPHENHYDRAMINE HCL 25 MG
25 CAPSULE ORAL
Status: DISCONTINUED | OUTPATIENT
Start: 2022-12-18 | End: 2022-12-19 | Stop reason: HOSPADM

## 2022-12-18 RX ORDER — MORPHINE SULFATE 10 MG/ML
6 INJECTION, SOLUTION INTRAMUSCULAR; INTRAVENOUS
Status: DISCONTINUED | OUTPATIENT
Start: 2022-12-18 | End: 2022-12-19 | Stop reason: HOSPADM

## 2022-12-18 RX ORDER — NALOXONE HYDROCHLORIDE 0.4 MG/ML
0.4 INJECTION, SOLUTION INTRAMUSCULAR; INTRAVENOUS; SUBCUTANEOUS AS NEEDED
Status: DISCONTINUED | OUTPATIENT
Start: 2022-12-18 | End: 2022-12-19 | Stop reason: HOSPADM

## 2022-12-18 RX ORDER — KETOROLAC TROMETHAMINE 30 MG/ML
30 INJECTION, SOLUTION INTRAMUSCULAR; INTRAVENOUS
Status: DISPENSED | OUTPATIENT
Start: 2022-12-18 | End: 2022-12-19

## 2022-12-18 RX ORDER — HYDROCORTISONE 1 %
CREAM (GRAM) TOPICAL AS NEEDED
Status: DISCONTINUED | OUTPATIENT
Start: 2022-12-18 | End: 2022-12-19 | Stop reason: HOSPADM

## 2022-12-18 RX ORDER — MORPHINE SULFATE 10 MG/ML
10 INJECTION, SOLUTION INTRAMUSCULAR; INTRAVENOUS
Status: DISCONTINUED | OUTPATIENT
Start: 2022-12-18 | End: 2022-12-19 | Stop reason: HOSPADM

## 2022-12-18 RX ORDER — HYDROCODONE BITARTRATE AND ACETAMINOPHEN 5; 325 MG/1; MG/1
1 TABLET ORAL
Status: DISCONTINUED | OUTPATIENT
Start: 2022-12-18 | End: 2022-12-18

## 2022-12-18 RX ORDER — DOCUSATE SODIUM 100 MG/1
100 CAPSULE, LIQUID FILLED ORAL
Status: DISCONTINUED | OUTPATIENT
Start: 2022-12-18 | End: 2022-12-19 | Stop reason: HOSPADM

## 2022-12-18 RX ORDER — ACETAMINOPHEN 500 MG
1000 TABLET ORAL EVERY 6 HOURS
Status: DISCONTINUED | OUTPATIENT
Start: 2022-12-18 | End: 2022-12-19 | Stop reason: HOSPADM

## 2022-12-18 RX ORDER — SIMETHICONE 80 MG
80 TABLET,CHEWABLE ORAL
Status: DISCONTINUED | OUTPATIENT
Start: 2022-12-18 | End: 2022-12-19 | Stop reason: HOSPADM

## 2022-12-18 RX ORDER — HYDROCODONE BITARTRATE AND ACETAMINOPHEN 7.5; 325 MG/1; MG/1
1 TABLET ORAL
Status: DISCONTINUED | OUTPATIENT
Start: 2022-12-18 | End: 2022-12-18

## 2022-12-18 RX ORDER — OXYCODONE HYDROCHLORIDE 5 MG/1
5 TABLET ORAL
Status: DISCONTINUED | OUTPATIENT
Start: 2022-12-18 | End: 2022-12-19 | Stop reason: HOSPADM

## 2022-12-18 RX ORDER — AMMONIA 15 % (W/V)
1 AMPUL (EA) INHALATION AS NEEDED
Status: DISCONTINUED | OUTPATIENT
Start: 2022-12-18 | End: 2022-12-19 | Stop reason: HOSPADM

## 2022-12-18 RX ORDER — SODIUM CHLORIDE 0.9 % (FLUSH) 0.9 %
5-40 SYRINGE (ML) INJECTION EVERY 8 HOURS
Status: DISCONTINUED | OUTPATIENT
Start: 2022-12-18 | End: 2022-12-19 | Stop reason: HOSPADM

## 2022-12-18 RX ORDER — SODIUM CHLORIDE 0.9 % (FLUSH) 0.9 %
5-40 SYRINGE (ML) INJECTION AS NEEDED
Status: DISCONTINUED | OUTPATIENT
Start: 2022-12-18 | End: 2022-12-19 | Stop reason: HOSPADM

## 2022-12-18 RX ORDER — SODIUM CHLORIDE, SODIUM LACTATE, POTASSIUM CHLORIDE, CALCIUM CHLORIDE 600; 310; 30; 20 MG/100ML; MG/100ML; MG/100ML; MG/100ML
125 INJECTION, SOLUTION INTRAVENOUS CONTINUOUS
Status: DISCONTINUED | OUTPATIENT
Start: 2022-12-18 | End: 2022-12-19 | Stop reason: HOSPADM

## 2022-12-18 RX ORDER — OXYCODONE HYDROCHLORIDE 5 MG/1
10 TABLET ORAL
Status: DISCONTINUED | OUTPATIENT
Start: 2022-12-18 | End: 2022-12-19 | Stop reason: HOSPADM

## 2022-12-18 RX ORDER — ONDANSETRON 4 MG/1
4 TABLET, ORALLY DISINTEGRATING ORAL
Status: DISCONTINUED | OUTPATIENT
Start: 2022-12-18 | End: 2022-12-19 | Stop reason: HOSPADM

## 2022-12-18 RX ORDER — IBUPROFEN 400 MG/1
800 TABLET ORAL EVERY 8 HOURS
Status: DISCONTINUED | OUTPATIENT
Start: 2022-12-18 | End: 2022-12-19 | Stop reason: HOSPADM

## 2022-12-18 RX ORDER — PEPPERMINT OIL
SPIRIT ORAL ONCE
Status: DISPENSED | OUTPATIENT
Start: 2022-12-18 | End: 2022-12-18

## 2022-12-18 RX ADMIN — KETOROLAC TROMETHAMINE 30 MG: 30 INJECTION, SOLUTION INTRAMUSCULAR; INTRAVENOUS at 04:48

## 2022-12-18 RX ADMIN — KETOROLAC TROMETHAMINE 30 MG: 30 INJECTION, SOLUTION INTRAMUSCULAR; INTRAVENOUS at 20:22

## 2022-12-18 RX ADMIN — KETOROLAC TROMETHAMINE 30 MG: 30 INJECTION, SOLUTION INTRAMUSCULAR; INTRAVENOUS at 14:07

## 2022-12-18 RX ADMIN — SODIUM CHLORIDE, POTASSIUM CHLORIDE, SODIUM LACTATE AND CALCIUM CHLORIDE 125 ML/HR: 600; 310; 30; 20 INJECTION, SOLUTION INTRAVENOUS at 01:38

## 2022-12-18 RX ADMIN — ACETAMINOPHEN 1000 MG: 500 TABLET ORAL at 14:05

## 2022-12-18 RX ADMIN — ACETAMINOPHEN 1000 MG: 500 TABLET ORAL at 20:22

## 2022-12-18 NOTE — OP NOTES
Operative Note    Patient: Dakota Newberry  YOB: 1995  MRN: [id number]    Date of Procedure: 2022     Pre-Op Diagnosis: Fetal Intolerance of Labor    Post-Op Diagnosis: Same as preoperative diagnosis. Procedure(s):   SECTION    Surgeon(s):  MD Lupe Lemus MD    Surgical Assistant: Surg Asst-1: Brynn Morel RN    Anesthesia: * No anesthesia type entered *     Estimated Blood Loss (mL):  See QBL    Complications: None    Specimens: * No specimens in log *     Implants: * No implants in log *    Drains: * No LDAs found *    Findings: VMI 9/9 APGARS    Detailed Description of Procedure:    Operative Note    Name: Dakota Newberry   Medical Record Number: [id number]   YOB: 1995  Today's Date: 2022      Pre-operative Diagnosis: Fetal Intolerance of Labor    Post-operative Diagnosis: * No post-op diagnosis entered *    Operation: low transverse  section Procedure(s):   SECTION    Surgeon(s):  MD Lupe Lemus MD    Anesthesia: * No anesthesia type entered *    Prophylactic Antibiotics: Azithromycin  and Ancef  DVT Prophylaxis: Sequential Compression Devices         Fetal Description: oshea     Birth Information:   Information for the patient's :  Renata Aldridge [[id number]]   Delivery of a   male infant on 2022 at 9:14 PM. Apgars were 9  and 9 . Umbilical Cord: 3 Vessels     Umbilical Cord Events: None     Placenta: Manual Removal removal with Normal appearance. Amniotic Fluid Volume:        Amniotic Fluid Description:  Clear        Placenta:  spontaneous Expressed    Estimated Blood Loss (ml):  See QBL    Specimens: Placenta Intact           Complications:  none    Procedure Detail:      After proper patient identification and consent, the patient was taken to the operating room, where epidural anesthesia was administered and found to be adequate.  Juan catheter had been placed using sterile technique. The patient was prepped and draped in the normal sterile fashion. The abdomen was entered using the Pfannenstiel technique. The peritoneum was entered sharply well superior to the bladder without any apparent injury. Eugenio Retractor placed into abdomen. The bladder flap was created. A low transverse uterine incision was made with the scalpel  and extended with blunt finger dissection. Amniotomy was performed and the fluid was small amount clear. The babys head was then delivered atraumatically. The cord was clamped and cut and the baby was handed off to Nursing staff in attendance. Placenta was then removed from the uterus. The uterus was curettaged with a moist lap pad and cleared of all clots and debris. The uterine incision was closed with 0 vicryl, double layer  in running locking fashion with good hemostasis assured. Good hemostasis was again reassured. The fascia was closed with 0 Vicryl in a running fashion. Good hemostasis was assured. The skin was closed with a 3-0 vicryl subcuticular closure. The patient tolerated the procedure well. Sponge, lap, and needle counts were correct times three and the patient and baby were taken to recovery/postpartum room in stable condition.     Joao Eng MD  December 17, 2022  9:56 PM                      Electronically Signed by Joao Eng MD on 12/17/2022 at 9:55 PM

## 2022-12-18 NOTE — PROGRESS NOTES
Labor Progress Note  Patient seen, very upset and exhausted after a very long IOL. fetal heart rate and contraction pattern evaluated, patient examined.   Visit Vitals  BP (!) 111/56 (BP 1 Location: Right arm, BP Patient Position: Lying left side)   Pulse 79   Temp 98.7 °F (37.1 °C)   Resp 16   Ht 5' 7\" (1.702 m)   Wt 200 lb (90.7 kg)   SpO2 97%   BMI 31.32 kg/m²       Physical Exam:  Cervical Exam:  7/C/-1  Membranes:   clear fluid   Uterine Activity: adequate  Fetal Heart Rate: Cat 2    Pit 7 - when turned off for fetal intolerance at 1940    Assessment/Plan:  Spoke to pt, reviewed cervical change  Pt is happy for the change but emotionally drained and very worried about fetal heart rate with variables and variability -     Plan to have Dr. Chloe Turk review strip and come speak to pt and family to discuss continuation of labor versus primary section  Pt and  in agreement of plan to discuss with dr. Latoya Regan CNM

## 2022-12-18 NOTE — DISCHARGE INSTRUCTIONS
Discharge Instructions for  Section    Patient ID:  Dakota Newberry  [id number]  20 y.o.  1995    Take Home Medications   See med list    Continue taking your prenatal vitamins if you are breastfeeding. Follow-up Appointment:  Follow-up with Dr. Jameson Gilbert in 2 weeks. Follow-up care is a key part of your treatment and safety. Be sure to make and go to all appointments, and call your doctor if you are having problems. Its also a good idea to know your test results and keep a list of the medicines you take. Activity  Avoid lifting more than 10 lbs for 6 weeks after your delivery. Don't put anything in your vagina for 6 weeks (no intercourse, tampons, or douching). Limit climbing stairs to twice a day, and please hold a railing. Have someone else carry your baby up stairs initially, as you may be a bit unsteady. No driving for about 2 weeks or until your are easily able to turn your body and move your foot from the gas to the brake pedal without pain- you need to be able to move quickly enough to respond to traffic. You cannot drive while you are taking narcotics (prescription pain medications). You may shower but do not take a bath for 2 weeks. Be sure to dry your incision well after your shower. You may gradually work up to light exercise such as brisk walking over the next few weeks, but take it easy- you'll be tired and sore! You need to wait 4-6 weeks before starting vigorous exercise such as aerobics, running, weight-lifting, sit-ups, etc.- clear this with your doctor at your postpartum check-up. Although it's important to limit certain activities and avoid \"over-doing it\", walking is good for you and will actually speed the healing process. Walking increases the blood flow to your legs, decreasing the risk of blood clots, and also encourages the bowels to speed up, decreasing constipation, bloating, and gassiness. Don't stay in bed at home, get up and move around the house.   Jadiel Wilson feel better more quickly. Diet  You may eat a regular diet but you may want to avoid heavy, greasy or spicy foods and other foods that could increase constipation and gas. Wound care  Your incision may have been closed with conventional (metal) staples, absorbable (dissolving) staples, or absorbable sutures. If you still have staples in your incision when you leave the hospital, call your doctor's office as instructed to schedule an appointment to have them removed (usually in about a week). Otherwise, there may be some small tapes over your incision called Steri-strips. Please remove these in about a week. There also may be Dermabond glue over your incision which looks like a clear, shiny coating (as if it were painted with clear fingernail polish). This will gradually peel off over the following weeks, do not remove it. It is normal to have a small amount of clear or reddish drainage from your incision. It's best to leave it open to the air, but if there is drainage, you may cover the incision lightly with gauze, preferably without tape. Keep the incision as dry as possible. You may even consider drying the incision with a cool hair dryer after you shower. Numbness of the skin at or around your incision is normal and the feeling usually returns gradually. Call your doctor if you have a lot of drainage from your incision, an unpleasant odor, red streaks, an increase in pain, or the incision appears to open up. Pain Management  You were probably given a prescription for pain medication, similar to the one you've been taking while in the hospital.  In addition to this, you can take over-the-counter pain medicines like Advil or Motrin (ibuprofen). You can take both medications together, alternating doses every few hours. You will get the most relief if you take the maximum dose:  Motrin or Advil (generic ibuprofen) 800 mg every 8 hours (4 tablets or capsules every 8 hours).   The prescription you were given probably contains a narcotic mixed with Tylenol, therefore, you should not take any extra Tylenol or acetominophen, or you could be getting more than the safe amount. If you feel you don't need the prescription pain medication, you may take Tylenol instead, along with ibuprofen. The maximum dose is Tylenol or acetominophen 1000 mg every 6 hours (equivalent to 2 Extra Strength Tylenols every 6 hours). After a few days, you should reduce the amount of prescription pain medication you are taking. At that point, try to use ibuprofen as the main medication, and take the prescription medication if needed for more severe pain not relieved by the ibuprofen. Your goal should be to take only the minimum necessary amounts of the prescription medication (narcotic), as these pain medicines can be addictive and will worsen or cause constipation. Most patients will find that within a few days, their pain is adequately controlled using only over-the-counter medications and minimal doses of prescription pain medicine. A heating pad can also be very helpful for cramping or back pain. Sitz baths are helpful for pain associated with hemorrhoids. You can use either a sitz bath basin or a bathtub filled with 2-3\" inches of plain warm water. Soak for 10 minutes 3 times a day. Over-the-counter hemorrhoid wipes and ointments are fine to use as well. Constipation  You may find that bowel movements are irregular after delivery and that you have a tendency to be constipated, especially after surgery. A stool softener such as Colace (docusate) can safely be taken daily if needed. If you become constipated you can use a laxative such as Dulcolax, Miralax or Milk of Magnesia. Don't wait until constipation is severe- take something sooner rather than later and you will feel much better! Suppositories such as Dulcolax or Fleet's Enemas are options too.     Your Recovery: What to Expect at Home  Delivering a baby is hard work and you probably aren't getting much sleep, so you will certainly be tired, especially after having a  section. Try to rest when you can and don't worry about doing housework or other tasks which can wait. The soreness in your incision will improve significantly over the first 2 weeks or so, but it may take 6-8 weeks before you are completely recovered. You are likely to have some back pain or general body aches or muscle soreness. This should improve with acetominophen or ibuprofen. If your legs were swollen during your pregnancy or as a result of IV fluids given during your hospital stay, this should go away in a few days to a week. Most women experience some form of the \"Baby Blues\" after having a baby. This means that you may feel emotional, tearful, frustrated, anxious, sad, and irritable some of the time. This is normal if it's not severe and should go away after about 2 weeks. Getting as much rest as you can will help. Accept assistance from friends and family members so that you can take breaks from caring for the baby. Call your doctor if your symptoms seem severe, last more than 2 weeks, or seem to be getting worse instead of better. Get help immediately if you have thoughts of wanting to hurt yourself or others! When should you call for help? Call 911 anytime you think you may need emergency care. For example, call if:  You pass out (lose consciousness). You have sudden chest pain and shortness of breath, or you cough up blood. You have severe pain in your belly. Call your doctor now or seek immediate medical care if:  You have heavy vaginal bleeding that soaks one or more pads in an hour, or you have large clots (golf ball size or larger). Your have foul-smelling discharge from your vagina or incision. You are sick to your stomach or cannot keep fluids down. You have pain that does not get better after you take pain medicine.   You have signs of infection, such as:  Increased pain in your abdomen or vaginal area. Red streaks, warmth, or tenderness of your breasts or the area around your incision. A fever of 101 or greater (taken by mouth). You have signs of a blood clot, such as:  Pain in your calf, back of knee, thigh, or groin. Redness and swelling in your leg or groin. You have trouble passing urine or stool, especially if you have pain or swelling in your lower belly; or if you are unable to have a bowel movement after taking a laxative. You have a fast or pounding heartbeat. Postpartum Support Groups   We know that all of us are dealing with a tremendous amount of uncertainty, confusion and disruption to our daily lives, which may result in increased anxiety, depression and fear. If you are feeling unsettled or worse, please know that we are here to help. During this time of increased caution and care for one another, [facility]partum [facility] [place] (Emmie Garcia) is offering virtual and in person support groups to ALL MOTHERS in [place] regardless of the age of your child/children as a way to help weather this emotional storm together. Social support is an important part of self-care during this time of physical distancing. Virtual postpartum support group meetings available at www. postpartumva.org  Warm Line: 896.406.2288    Breastfeeding Support Groups   1st and 3rd Wednesday of each month at [place]  2nd and 4th Tuesday of each month at [facility] (in education center behind cafeteria)    [place] at www.[employer] ([employer]) under the \"About Us\" and \"Classes and Events tabs\"

## 2022-12-18 NOTE — PROGRESS NOTES
Labor Progress Note  Patient seen by Dr. Chloe Turk - fetal heart rate and contraction pattern evaluated, by him, he spoke to pt and family - see his note  Visit Vitals  BP (!) 111/56 (BP 1 Location: Right arm, BP Patient Position: Lying left side)   Pulse 79   Temp 98.7 °F (37.1 °C)   Resp 16   Ht 5' 7\" (1.702 m)   Wt 200 lb (90.7 kg)   SpO2 97%   BMI 31.32 kg/m²         Assessment/Plan:  Plan for primary section per family request     Hi Coleman CNM

## 2022-12-18 NOTE — PROGRESS NOTES
OB Labor Progress Note:    Patient seen, fetal heart rate and contraction pattern evaluated. Physical Exam:  Cervical Exam: not examined  Membranes:  Ruptured  Uterine Activity: Frequency: regular  Fetal Heart Rate: 130  Accelerations: yes  Decelerations: variables and occasional prolonged decelerations  Uterine contractions: regular    Assessment/Plan:  CAt 2 NST with IUGR fetus  Prolonged IOl with episodic variables and late decelerations  Patient Asking for  section- discussed risk, benefits indications and alternatives discussed including risk of Bleeding, infection, injury to other organs, and anesthetic complications. Option of continuing with IOL now that her cervix is now finally starting to change, however,patient is mentally and pysically tired of being in labor and requests  section at this time .     Krystal Hood MD

## 2022-12-18 NOTE — ANESTHESIA POSTPROCEDURE EVALUATION
Procedure(s):   SECTION.     epidural    Anesthesia Post Evaluation      Multimodal analgesia: multimodal analgesia not used between 6 hours prior to anesthesia start to PACU discharge  Patient location during evaluation: bedside  Level of consciousness: awake  Pain score: 0  Pain management: adequate  Airway patency: patent  Anesthetic complications: no  Cardiovascular status: acceptable  Respiratory status: acceptable  Hydration status: acceptable  Post anesthesia nausea and vomiting:  none  Final Post Anesthesia Temperature Assessment:  Normothermia (36.0-37.5 degrees C)      INITIAL Post-op Vital signs:   Vitals Value Taken Time   /57 22   Temp 36.8 °C (98.2 °F) 22   Pulse 120 22   Resp 20 22   SpO2 97 % 22

## 2022-12-18 NOTE — PROGRESS NOTES
Post-Operative  Day 1    [address] 61         Information for the patient's :  Gabriella Leija [[id number]]   , Low Transverse  Patient doing well without unusual complaints. Tolerating reg diet. no flatus yet. Voiding without difficulty. Vitals:  Visit Vitals  /66 (BP 1 Location: Left upper arm, BP Patient Position: At rest;Sitting)   Pulse 99   Temp 98.8 °F (37.1 °C)   Resp 16   Ht 5' 7\" (1.702 m)   Wt 90.7 kg (200 lb)   SpO2 96%   Breastfeeding Unknown   BMI 31.32 kg/m²     Temp (24hrs), Av °F (37.2 °C), Min:98.2 °F (36.8 °C), Max:100.4 °F (38 °C)      Last 24hr Input/Output:    Intake/Output Summary (Last 24 hours) at 2022 1241  Last data filed at 2022 0445  Gross per 24 hour   Intake 650 ml   Output 2695 ml   Net -2045 ml          Exam:       Patient without distress. Abdomen:soft, expected tenderness, fundus firm, wound dressing intact     Lower extremities are nontender without edema.      Labs:   Lab Results   Component Value Date/Time    WBC 12.3 (H) 2022 05:53 AM    WBC 7.4 12/15/2022 04:14 PM    HGB 10.4 (L) 2022 05:53 AM    HGB 12.6 12/15/2022 04:14 PM    HCT 30.4 (L) 2022 05:53 AM    HCT 36.3 12/15/2022 04:14 PM    PLATELET 499 (L)  05:53 AM    PLATELET 400  04:14 PM       Recent Results (from the past 24 hour(s))   CBC WITH AUTOMATED DIFF    Collection Time: 22  5:53 AM   Result Value Ref Range    WBC 12.3 (H) 3.6 - 11.0 K/uL    RBC 3.20 (L) 3.80 - 5.20 M/uL    HGB 10.4 (L) 11.5 - 16.0 g/dL    HCT 30.4 (L) 35.0 - 47.0 %    MCV 95.0 80.0 - 99.0 FL    MCH 32.5 26.0 - 34.0 PG    MCHC 34.2 30.0 - 36.5 g/dL    RDW 12.7 11.5 - 14.5 %    PLATELET 410 (L) 103 - 400 K/uL    MPV 9.3 8.9 - 12.9 FL    NRBC 0.0 0  WBC    ABSOLUTE NRBC 0.00 0.00 - 0.01 K/uL    NEUTROPHILS 85 (H) 32 - 75 %    LYMPHOCYTES 9 (L) 12 - 49 %    MONOCYTES 6 5 - 13 %    EOSINOPHILS 0 0 - 7 %    BASOPHILS 0 0 - 1 %    IMMATURE GRANULOCYTES 0 0.0 - 0.5 %    ABS. NEUTROPHILS 10.4 (H) 1.8 - 8.0 K/UL    ABS. LYMPHOCYTES 1.2 0.8 - 3.5 K/UL    ABS. MONOCYTES 0.7 0.0 - 1.0 K/UL    ABS. EOSINOPHILS 0.0 0.0 - 0.4 K/UL    ABS. BASOPHILS 0.0 0.0 - 0.1 K/UL    ABS. IMM. GRANS. 0.1 (H) 0.00 - 0.04 K/UL    DF AUTOMATED             Assessment and Plan:  POD 1 s/p PCS at maternal request during IOL for IUGR. Doing well. Rh pos, Rub imm. Boy - desires circ (will perform today). Routine care.     Signed By: Mady Mojica MD     2022

## 2022-12-18 NOTE — LACTATION NOTE
This note was copied from a baby's chart. Infant born via C/S last evening to a  mom at 38 weeks gestation. Mom has lots of easily expressed colostrum. Per mom, infant has been latching well to left breast, but she is having a more difficult time on the right. I assisted mom with latching infant to the right breast in the football position. Deep latch noted with rhythmic sucking for a short time. He had just nursed well on the left side at the time of my visit, with swallows heard, per parents. I demonstrated hand expression and obtained approximately 2 ml and syringe fed it to the infant. Infant to be circ'd today; educated parents on potential impact on feedings due to fatigue following circ. Feeding Plan: Mother will keep baby skin to skin as often as possible, feed on demand, 8-12x/day , respond to feeding cues, obtain latch, listen for audible swallowing, be aware of signs of oxytocin release/ cramping,thirst,sleepiness while breastfeeding, offer both breasts,and will not limit feedings. Mother agrees to utilize breast massage while nursing to facilitate lactogenesis.

## 2022-12-18 NOTE — BRIEF OP NOTE
Brief Postoperative Note    Patient: Sweta Hudson  YOB: 1995  MRN: [id number]    Date of Procedure: 2022     Pre-Op Diagnosis: Fetal Intolerance of Labor    Post-Op Diagnosis: Same as preoperative diagnosis.       Procedure(s):   SECTION    Surgeon(s):  Benjamin Aase, MD Carolee Hickory, MD    Surgical Assistant: Surg Asst-1: Abdirizak Herman RN    Anesthesia: * No anesthesia type entered *     Estimated Blood Loss (mL): see QBL    Complications: None    Specimens: * No specimens in log *     Implants: * No implants in log *    Drains: * No LDAs found *    Findings: Viable Male infant 9/9 ApGARs    Electronically Signed by Junior Monreal MD on 2022 at 9:54 PM

## 2022-12-18 NOTE — PROGRESS NOTES
1930- Bedside shift change report given to Rory Calixto RN (oncoming nurse) by Frances Galvan RN (offgoing nurse). Report included the following information SBAR, OR Summary, Procedure Summary, Intake/Output, and MAR.      1941- Pt turned to right side. Bed in T garcia. Fluid bolus started. J [id number] called to bedside. [address] at bedside SVE 7/100/-1.    2004- Dr Drew Peck and Augie Lott CNM at bedside discussing plan of care with patient. Risks and benefits of c section discussed by Dr Drew Peck. Pt has decided to proceed with C section. 2011- C section called. 2020- Dr Claus Betts at bedside. 2036- CRNA at bedside. 0045- TRANSFER - OUT REPORT:    Verbal report given to [facility] RN(name) on Sadaf Avalos  being transferred to [facility](unit) for routine progression of care       Report consisted of patients Situation, Background, Assessment and   Recommendations(SBAR). Information from the following report(s) SBAR, Procedure Summary, Intake/Output, and MAR was reviewed with the receiving nurse. Lines:   Peripheral IV 12/15/22 Anterior;Right Hand (Active)   Site Assessment Clean, dry, & intact 12/16/22 1933   Phlebitis Assessment 0 12/16/22 1933   Infiltration Assessment 0 12/16/22 1933   Dressing Status Clean, dry, & intact 12/16/22 1933   Dressing Type Tape;Transparent 12/16/22 1933   Hub Color/Line Status Pink; Infusing 12/16/22 1933        Opportunity for questions and clarification was provided.       Patient transported with:   Registered Nurse

## 2022-12-18 NOTE — ROUTINE PROCESS
0030: TRANSFER - IN REPORT:    Verbal report received from PITER Carter RN(name) on [facility]  being received from L&D (unit) for routine progression of care      Report consisted of patients Situation, Background, Assessment and   Recommendations(SBAR). Information from the following report(s) SBAR, Kardex, ED Summary, OR Summary, Procedure Summary, Intake/Output, MAR, and Recent Results was reviewed with the receiving nurse. Opportunity for questions and clarification was provided. Assessment completed upon patients arrival to unit and care assumed. 0110: Spoke with Nicko REED regarding toradol administration in OR, per MD, needs to check anesthesia record to verify as medication not scanned in computer. 0115: Per Nicko REED, toradol given by NAVEEN Borrego CRNA at 2200. Anesthesia unable to scan medications into [facility] electronically. Per MD, time was verified over telephone with readback regarding administration, and to admin next dose based on initial admin time of 2200.

## 2022-12-18 NOTE — ROUTINE PROCESS
Bedside and Verbal shift change report given to WYATT Aguilar RN (oncoming nurse) by Tanya Yost RN (offgoing nurse). Report included the following information SBAR, Kardex, Intake/Output, MAR, and Recent Results.

## 2022-12-19 VITALS
HEART RATE: 91 BPM | WEIGHT: 200 LBS | BODY MASS INDEX: 31.39 KG/M2 | HEIGHT: 67 IN | DIASTOLIC BLOOD PRESSURE: 66 MMHG | SYSTOLIC BLOOD PRESSURE: 103 MMHG | OXYGEN SATURATION: 99 % | RESPIRATION RATE: 14 BRPM | TEMPERATURE: 98.4 F

## 2022-12-19 PROCEDURE — 74011250637 HC RX REV CODE- 250/637: Performed by: OBSTETRICS & GYNECOLOGY

## 2022-12-19 RX ORDER — IBUPROFEN 800 MG/1
800 TABLET ORAL
Qty: 30 TABLET | Refills: 0 | Status: SHIPPED | OUTPATIENT
Start: 2022-12-19

## 2022-12-19 RX ADMIN — IBUPROFEN 800 MG: 400 TABLET, FILM COATED ORAL at 03:20

## 2022-12-19 RX ADMIN — IBUPROFEN 800 MG: 400 TABLET, FILM COATED ORAL at 12:21

## 2022-12-19 RX ADMIN — ACETAMINOPHEN 1000 MG: 500 TABLET ORAL at 03:19

## 2022-12-19 RX ADMIN — ACETAMINOPHEN 1000 MG: 500 TABLET ORAL at 09:40

## 2022-12-19 RX ADMIN — DOCUSATE SODIUM 100 MG: 100 CAPSULE, LIQUID FILLED ORAL at 09:39

## 2022-12-19 NOTE — PROGRESS NOTES
Post-Operative  Day 2    [facility]     Information for the patient's :  Clarisa Syed [[id number]]   , Low Transverse  Patient doing well without unusual complaints. Passing flatus, voiding and ambulating without difficulty. Tolerating regular diet. Wants d/c home today    Vitals:  Visit Vitals  /73   Pulse 80   Temp 98.9 °F (37.2 °C)   Resp 16   Ht 5' 7\" (1.702 m)   Wt 90.7 kg (200 lb)   SpO2 97%   Breastfeeding Unknown   BMI 31.32 kg/m²     Temp (24hrs), Av.6 °F (37 °C), Min:97.9 °F (36.6 °C), Max:99.1 °F (37.3 °C)        Exam:      Patient without distress. Abdomen: soft, expected tenderness, fundus firm              Aquacel dressing in place and dry               Lower extremities are nontender with 1+ BLE ankle edema. No cords    Labs:   Lab Results   Component Value Date/Time    WBC 12.3 (H) 2022 05:53 AM    WBC 7.4 12/15/2022 04:14 PM    HGB 10.4 (L) 2022 05:53 AM    HGB 12.6 12/15/2022 04:14 PM    HCT 30.4 (L) 2022 05:53 AM    HCT 36.3 12/15/2022 04:14 PM    PLATELET 239 (L)  05:53 AM    PLATELET 590  04:14 PM       No results found for this or any previous visit (from the past 24 hour(s)). Assessment: Post-Op day 2, doing well  Rh+/RI    Plan:   1.  Routine post-operative care  D/c home- instructions given

## 2022-12-19 NOTE — ROUTINE PROCESS
Bedside shift change report given to WYATT Bowles RN (oncoming nurse) by Lanette Esteves. Fransisco Farah RN (offgoing nurse). Report included the following information SBAR and Kardex.

## 2022-12-19 NOTE — PROGRESS NOTES
Anesthesia Post Op note    Pt is POD#1 from a c section. Pt is doing well without complaints. She is moving LEs normally bilaterally, voiding without difficulty and having no pain around the epidural site. Also, no nausea or pruritus noted. Proceed with normal routine care, pt doing well from anesthesia stand point.

## 2022-12-19 NOTE — ROUTINE PROCESS
Bedside shift change report given to TATIANA Marie (oncoming nurse) by Brittany Sosa RN (offgoing nurse). Report included the following information SBAR.      1220-Pt discharged home with family. Discharge instructions and medication times reviewed. Pt verbalized understanding. Signature obtained on paper and placed on chart.

## 2022-12-19 NOTE — DISCHARGE SUMMARY
Patient ID:  Juan Manuel Rabago  [id number]  8 y.o.  1995    Admit Date: 12/15/2022    Discharge Date: 2022     Admitting Physician: Eladia Godfrey MD  Attending Physician: Katherina Kayser, MD    Admission Diagnoses: IUGR (intrauterine growth restriction) affecting care of mother [O36.5990]    Discharge Diagnoses: Same as above with primary C/S producing a viable infant. Information for the patient's :  Ivette Serrano [[id number]]           Maternal Labs:   Lab Results   Component Value Date/Time    HBsAg, External negative 2022 12:00 AM    HIV, External negative 2022 12:00 AM    Rubella, External immune 2022 12:00 AM    RPR, External non reactive 2022 12:00 AM    GrBStrep, External negative 2022 12:00 AM       Cord Blood Results:   Information for the patient's :  Ivette Serrano [[id number]]   No results found for: PCTABR, ABORH, PCTDIG, BILI, ABORH, ABORHEXT         History of Present Illness:   OB History          1    Para   1    Term   1            AB        Living   1         SAB        IAB        Ectopic        Molar        Multiple   0    Live Births   1              Admitted for  IOL due to IUGR . Hospital Course:   Patient was admitted as above and delivered via C/S per pt request (due to prolonged IOL) . Please the chart for details. The postpartum course was unremarkable. She was deemed stable for discharge home on day 2. Follow-up:  She was instructed to follow-up with her obstetrician in 2 weeks.     Results of Postpartum Depression Screening:  EPDS  Retired Use Flowsheet (6) [place]  Depression Scale  I have been able to laugh and see the funny side of things: As much as I always could  I have looked forward with enjoyment to things: As much as I ever did  I have blamed myself unnecessarily when things went wrong: Yes, some of the time  I have been anxious or worried for no good reason: Yes, sometimes  I have felt scared or panicky for no very good reason: Yes, sometimes  Things have been getting on top of me: No, most of the time I have coped quite well  I have been so unhappy that I have had difficulty sleeping: No, not at all  I have felt sad or miserable: No, not at all  I have been so unhappy that I have been crying: Only occasionally  The thought of harming myself has occurred to me: Never  Total Score: 8          Signed:   Nabila Hosknis MD  12/19/2022  7:48 AM

## 2023-10-20 ENCOUNTER — APPOINTMENT (OUTPATIENT)
Facility: HOSPITAL | Age: 28
End: 2023-10-20
Payer: OTHER GOVERNMENT

## 2023-10-20 ENCOUNTER — HOSPITAL ENCOUNTER (EMERGENCY)
Facility: HOSPITAL | Age: 28
Discharge: HOME | End: 2023-10-20
Attending: STUDENT IN AN ORGANIZED HEALTH CARE EDUCATION/TRAINING PROGRAM
Payer: OTHER GOVERNMENT

## 2023-10-20 VITALS
SYSTOLIC BLOOD PRESSURE: 128 MMHG | HEIGHT: 67 IN | WEIGHT: 181.66 LBS | RESPIRATION RATE: 20 BRPM | OXYGEN SATURATION: 98 % | TEMPERATURE: 97.6 F | DIASTOLIC BLOOD PRESSURE: 68 MMHG | HEART RATE: 86 BPM | BODY MASS INDEX: 28.51 KG/M2

## 2023-10-20 DIAGNOSIS — R10.32 LEFT LOWER QUADRANT ABDOMINAL PAIN: Primary | ICD-10-CM

## 2023-10-20 LAB
ALBUMIN SERPL-MCNC: 3.6 G/DL (ref 3.5–5)
ALBUMIN/GLOB SERPL: 1 (ref 1.1–2.2)
ALP SERPL-CCNC: 102 U/L (ref 45–117)
ALT SERPL-CCNC: 20 U/L (ref 12–78)
ANION GAP SERPL CALC-SCNC: 5 MMOL/L (ref 5–15)
APPEARANCE UR: CLEAR
AST SERPL-CCNC: 6 U/L (ref 15–37)
BACTERIA URNS QL MICRO: ABNORMAL /HPF
BASOPHILS # BLD: 0 K/UL (ref 0–0.1)
BASOPHILS NFR BLD: 0 % (ref 0–1)
BILIRUB SERPL-MCNC: 0.4 MG/DL (ref 0.2–1)
BILIRUB UR QL: NEGATIVE
BUN SERPL-MCNC: 10 MG/DL (ref 6–20)
BUN/CREAT SERPL: 12 (ref 12–20)
CALCIUM SERPL-MCNC: 9.7 MG/DL (ref 8.5–10.1)
CHLORIDE SERPL-SCNC: 105 MMOL/L (ref 97–108)
CO2 SERPL-SCNC: 30 MMOL/L (ref 21–32)
COLOR UR: ABNORMAL
CREAT SERPL-MCNC: 0.82 MG/DL (ref 0.55–1.02)
DIFFERENTIAL METHOD BLD: NORMAL
EOSINOPHIL # BLD: 0.1 K/UL (ref 0–0.4)
EOSINOPHIL NFR BLD: 1 % (ref 0–7)
EPITH CASTS URNS QL MICRO: ABNORMAL /LPF
ERYTHROCYTE [DISTWIDTH] IN BLOOD BY AUTOMATED COUNT: 11.9 % (ref 11.5–14.5)
GLOBULIN SER CALC-MCNC: 3.6 G/DL (ref 2–4)
GLUCOSE SERPL-MCNC: 103 MG/DL (ref 65–100)
GLUCOSE UR STRIP.AUTO-MCNC: NEGATIVE MG/DL
HCG UR QL: NEGATIVE
HCT VFR BLD AUTO: 46.6 % (ref 35–47)
HGB BLD-MCNC: 15.5 G/DL (ref 11.5–16)
HGB UR QL STRIP: NEGATIVE
IMM GRANULOCYTES # BLD AUTO: 0 K/UL (ref 0–0.04)
IMM GRANULOCYTES NFR BLD AUTO: 0 % (ref 0–0.5)
KETONES UR QL STRIP.AUTO: NEGATIVE MG/DL
LEUKOCYTE ESTERASE UR QL STRIP.AUTO: NEGATIVE
LIPASE SERPL-CCNC: 39 U/L (ref 13–75)
LYMPHOCYTES # BLD: 1.8 K/UL (ref 0.8–3.5)
LYMPHOCYTES NFR BLD: 35 % (ref 12–49)
MCH RBC QN AUTO: 30.1 PG (ref 26–34)
MCHC RBC AUTO-ENTMCNC: 33.3 G/DL (ref 30–36.5)
MCV RBC AUTO: 90.5 FL (ref 80–99)
MONOCYTES # BLD: 0.3 K/UL (ref 0–1)
MONOCYTES NFR BLD: 6 % (ref 5–13)
NEUTS SEG # BLD: 2.9 K/UL (ref 1.8–8)
NEUTS SEG NFR BLD: 58 % (ref 32–75)
NITRITE UR QL STRIP.AUTO: NEGATIVE
NRBC # BLD: 0 K/UL (ref 0–0.01)
NRBC BLD-RTO: 0 PER 100 WBC
PH UR STRIP: 8 (ref 5–8)
PLATELET # BLD AUTO: 242 K/UL (ref 150–400)
PMV BLD AUTO: 9.1 FL (ref 8.9–12.9)
POTASSIUM SERPL-SCNC: 4.3 MMOL/L (ref 3.5–5.1)
PROT SERPL-MCNC: 7.2 G/DL (ref 6.4–8.2)
PROT UR STRIP-MCNC: NEGATIVE MG/DL
RBC # BLD AUTO: 5.15 M/UL (ref 3.8–5.2)
RBC #/AREA URNS HPF: ABNORMAL /HPF (ref 0–5)
SODIUM SERPL-SCNC: 140 MMOL/L (ref 136–145)
SP GR UR REFRACTOMETRY: 1.02 (ref 1–1.03)
SPECIMEN HOLD: NORMAL
UROBILINOGEN UR QL STRIP.AUTO: 1 EU/DL (ref 0.2–1)
WBC # BLD AUTO: 5.1 K/UL (ref 3.6–11)
WBC URNS QL MICRO: ABNORMAL /HPF (ref 0–4)

## 2023-10-20 PROCEDURE — 81001 URINALYSIS AUTO W/SCOPE: CPT

## 2023-10-20 PROCEDURE — 81025 URINE PREGNANCY TEST: CPT

## 2023-10-20 PROCEDURE — 85025 COMPLETE CBC W/AUTO DIFF WBC: CPT

## 2023-10-20 PROCEDURE — 80053 COMPREHEN METABOLIC PANEL: CPT

## 2023-10-20 PROCEDURE — 83690 ASSAY OF LIPASE: CPT

## 2023-10-20 PROCEDURE — 74177 CT ABD & PELVIS W/CONTRAST: CPT

## 2023-10-20 PROCEDURE — 36415 COLL VENOUS BLD VENIPUNCTURE: CPT

## 2023-10-20 PROCEDURE — 99285 EMERGENCY DEPT VISIT HI MDM: CPT

## 2023-10-20 PROCEDURE — 6360000004 HC RX CONTRAST MEDICATION: Performed by: STUDENT IN AN ORGANIZED HEALTH CARE EDUCATION/TRAINING PROGRAM

## 2023-10-20 RX ORDER — KETOROLAC TROMETHAMINE 30 MG/ML
15 INJECTION, SOLUTION INTRAMUSCULAR; INTRAVENOUS
Status: DISCONTINUED | OUTPATIENT
Start: 2023-10-20 | End: 2023-10-20 | Stop reason: HOSPADM

## 2023-10-20 RX ORDER — DICYCLOMINE HCL 20 MG
20 TABLET ORAL 4 TIMES DAILY
Qty: 20 TABLET | Refills: 0 | Status: SHIPPED | OUTPATIENT
Start: 2023-10-20

## 2023-10-20 RX ORDER — ONDANSETRON 2 MG/ML
4 INJECTION INTRAMUSCULAR; INTRAVENOUS
Status: DISCONTINUED | OUTPATIENT
Start: 2023-10-20 | End: 2023-10-20 | Stop reason: HOSPADM

## 2023-10-20 RX ADMIN — IOPAMIDOL 100 ML: 755 INJECTION, SOLUTION INTRAVENOUS at 13:20

## 2023-10-20 ASSESSMENT — ENCOUNTER SYMPTOMS
COUGH: 0
BACK PAIN: 0
SHORTNESS OF BREATH: 0
VOMITING: 0
CONSTIPATION: 0
DIARRHEA: 0
TROUBLE SWALLOWING: 0
NAUSEA: 0
ABDOMINAL PAIN: 0

## 2023-10-20 ASSESSMENT — PAIN - FUNCTIONAL ASSESSMENT
PAIN_FUNCTIONAL_ASSESSMENT: ACTIVITIES ARE NOT PREVENTED
PAIN_FUNCTIONAL_ASSESSMENT: 0-10

## 2023-10-20 ASSESSMENT — PAIN DESCRIPTION - ORIENTATION: ORIENTATION: LEFT;LOWER

## 2023-10-20 ASSESSMENT — PAIN DESCRIPTION - DESCRIPTORS: DESCRIPTORS: SHARP

## 2023-10-20 ASSESSMENT — PAIN DESCRIPTION - FREQUENCY: FREQUENCY: CONTINUOUS

## 2023-10-20 ASSESSMENT — PAIN SCALES - GENERAL: PAINLEVEL_OUTOF10: 8

## 2023-10-20 ASSESSMENT — PAIN DESCRIPTION - PAIN TYPE: TYPE: ACUTE PAIN

## 2023-10-20 ASSESSMENT — PAIN DESCRIPTION - ONSET: ONSET: ON-GOING

## 2023-10-20 ASSESSMENT — PAIN DESCRIPTION - LOCATION: LOCATION: ABDOMEN

## 2023-10-20 NOTE — DISCHARGE INSTRUCTIONS
Increase fluids in your diet to the point that you are drinking 8 ounces every hour that you are awake; every other drink be water. Increase fiber through foods such as fiber 1 cereal, oatmeal, fruits, vegetables and fiber supplements such as Benefiber or sugar-free Metamucil.

## 2023-10-20 NOTE — ED TRIAGE NOTES
Patient is coming in with lower left abdominal pain since yesterday. + nausea but denies vomiting. Denies urinary symptoms.

## 2023-10-20 NOTE — ED NOTES
Patient stated verbal understanding of dc paperwork and ambulated from ed without incident.        Grace Bailey RN  10/20/23 [id number]

## 2023-10-20 NOTE — ED PROVIDER NOTES
[facility] EMERGENCY DEP  EMERGENCY DEPARTMENT ENCOUNTER      Pt Name: Sariah Zimmer  MRN: [id number]  [address] [facility] [place] 1995  Date of evaluation: 10/20/2023  Provider: SRIKANTH Monk NP    [address]       Chief Complaint   Patient presents with    Abdominal Pain         HISTORY OF PRESENT ILLNESS   (Location/Symptom, Timing/Onset, Context/Setting, Quality, Duration, Modifying Factors, Severity)  Note limiting factors. HPI patient is a 13-year-old female with no significant past medical history presents to the ED with left lower quadrant abdominal pain since yesterday. She states the pain kept her awake during the night. Denies fever, cold symptoms, cough, headache, neck pain, visual changes, focal weakness or rash. Denies any difficulty breathing, difficulty swallowing, SOB or chest pain. Denies any nausea, vomiting or diarrhea. Pt. Reports eating a breakfast sandwich this morning around 730 without change in the pain. She has not had any pain medications today prior to arrival.  Last menstrual period was October 10, 2023. Review of External Medical Records:     Nursing Notes were reviewed. REVIEW OF SYSTEMS    (2-9 systems for level 4, 10 or more for level 5)     Review of Systems   Constitutional:  Negative for activity change, appetite change, fever and unexpected weight change. HENT:  Negative for congestion and trouble swallowing. Eyes:  Negative for visual disturbance. Respiratory:  Negative for cough and shortness of breath. Cardiovascular:  Negative for chest pain, palpitations and leg swelling. Gastrointestinal:  Negative for abdominal pain, constipation, diarrhea, nausea and vomiting. Genitourinary:  Negative for dysuria. Musculoskeletal:  Negative for back pain and neck pain. Neurological:  Negative for dizziness, light-headedness and headaches. All other systems reviewed and are negative.       Except as noted above the remainder of the review of systems was

## (undated) DEVICE — 3000CC GUARDIAN II: Brand: GUARDIAN

## (undated) DEVICE — LIGHT HANDLE: Brand: DEVON

## (undated) DEVICE — ROYAL SILK SURGICAL GOWN, XXL: Brand: CONVERTORS

## (undated) DEVICE — MEDI-VAC NON-CONDUCTIVE SUCTION TUBING: Brand: CARDINAL HEALTH

## (undated) DEVICE — DRAPE FLD WRM W44XL66IN C6L FOR INTRATEMP SYS THERMABASIN

## (undated) DEVICE — PACK PROCEDURE SURG C SECT KT SMH

## (undated) DEVICE — SPONGE GZ W4XL4IN COT RADPQ HIGHLY ABSRB

## (undated) DEVICE — DRESSING SIL W4XL5IN ANTIBACT GELLING FBR CYTOFORM

## (undated) DEVICE — 3M™ TEGADERM™ TRANSPARENT FILM DRESSING FRAME STYLE, 1628, 6 IN X 8 IN (15 CM X 20 CM), 10/CT 8CT/CASE: Brand: 3M™ TEGADERM™

## (undated) DEVICE — SUTURE VCRL SZ 2-0 L36IN ABSRB VLT L36MM CT-1 1/2 CIR J345H

## (undated) DEVICE — KENDALL SCD EXPRESS SLEEVES, KNEE LENGTH, MEDIUM: Brand: KENDALL SCD

## (undated) DEVICE — REM POLYHESIVE ADULT PATIENT RETURN ELECTRODE: Brand: VALLEYLAB

## (undated) DEVICE — POWDER HEMOSTAT GEL 3.0GR -- SURGICEL

## (undated) DEVICE — SOLUTION IRRIG 1000ML H2O STRL BLT

## (undated) DEVICE — SUTURE MCRYL SZ 4-0 L27IN ABSRB UD L24MM PS-1 3/8 CIR PRIM Y935H

## (undated) DEVICE — SPINAL TRAY NDL 24 GAX4 IN SPROTTE

## (undated) DEVICE — DEVICE ES L3M EDGE COAT HEX LOK BLDE ELECTRD HOLSTER FORC

## (undated) DEVICE — SPONGE LAPAROTOMY W18XL18IN WHITE STRUNG RADIOPAQUE STERILE

## (undated) DEVICE — (D)PREP SKN CHLRAPRP APPL 26ML -- CONVERT TO ITEM 371833

## (undated) DEVICE — STERILE POLYISOPRENE POWDER-FREE SURGICAL GLOVES: Brand: PROTEXIS

## (undated) DEVICE — COVERALL PREM SMS 2XL KNIT --

## (undated) DEVICE — SUTURE VCRL SZ 0 L36IN ABSRB VLT L40MM CT 1/2 CIR J358H

## (undated) DEVICE — POOLE SUCTION INSTRUMENT WITH REMOVABLE SHEATH: Brand: POOLE

## (undated) DEVICE — STERILE POLYISOPRENE POWDER-FREE SURGICAL GLOVES WITH EMOLLIENT COATING: Brand: PROTEXIS

## (undated) DEVICE — SOLUTION IV 1000ML 0.9% SOD CHL

## (undated) DEVICE — DEVON™ KNEE AND BODY STRAP 60" X 3" (1.5 M X 7.6 CM): Brand: DEVON

## (undated) DEVICE — SOLIDIFIER FLUID 3000 CC ABSORB

## (undated) DEVICE — ROYALSILK SURGICAL GOWN, L: Brand: CONVERTORS

## (undated) DEVICE — CATH FOLEY 16F LUBRI-SIL IC --